# Patient Record
Sex: FEMALE | Race: BLACK OR AFRICAN AMERICAN | Employment: OTHER | URBAN - METROPOLITAN AREA
[De-identification: names, ages, dates, MRNs, and addresses within clinical notes are randomized per-mention and may not be internally consistent; named-entity substitution may affect disease eponyms.]

---

## 2017-01-23 ENCOUNTER — APPOINTMENT (OUTPATIENT)
Dept: GENERAL RADIOLOGY | Facility: HOSPITAL | Age: 77
DRG: 516 | End: 2017-01-23
Attending: EMERGENCY MEDICINE
Payer: MEDICARE

## 2017-01-23 ENCOUNTER — HOSPITAL ENCOUNTER (INPATIENT)
Facility: HOSPITAL | Age: 77
LOS: 2 days | Discharge: HOME | DRG: 516 | End: 2017-01-25
Attending: EMERGENCY MEDICINE | Admitting: HOSPITALIST
Payer: MEDICARE

## 2017-01-23 ENCOUNTER — ANESTHESIA EVENT (OUTPATIENT)
Dept: SURGERY | Facility: HOSPITAL | Age: 77
DRG: 516 | End: 2017-01-23
Payer: MEDICARE

## 2017-01-23 ENCOUNTER — APPOINTMENT (OUTPATIENT)
Dept: CT IMAGING | Facility: HOSPITAL | Age: 77
DRG: 516 | End: 2017-01-23
Attending: EMERGENCY MEDICINE
Payer: MEDICARE

## 2017-01-23 DIAGNOSIS — R51.9 NONINTRACTABLE HEADACHE, UNSPECIFIED CHRONICITY PATTERN, UNSPECIFIED HEADACHE TYPE: Primary | ICD-10-CM

## 2017-01-23 DIAGNOSIS — M31.6 TEMPORAL ARTERITIS (HCC): ICD-10-CM

## 2017-01-23 PROBLEM — Z91.81 AT RISK FOR FALLING: Status: ACTIVE | Noted: 2017-01-23

## 2017-01-23 LAB
ALBUMIN SERPL-MCNC: 3.9 G/DL (ref 3.5–4.8)
ALP LIVER SERPL-CCNC: 65 U/L (ref 55–142)
ALT SERPL-CCNC: 20 U/L (ref 14–54)
AST SERPL-CCNC: 18 U/L (ref 15–41)
ATRIAL RATE: 76 BPM
BASOPHILS # BLD AUTO: 0.02 X10(3) UL (ref 0–0.1)
BASOPHILS NFR BLD AUTO: 0.2 %
BILIRUB SERPL-MCNC: 0.2 MG/DL (ref 0.1–2)
BUN BLD-MCNC: 17 MG/DL (ref 8–20)
C-REACTIVE PROTEIN: 1.59 MG/DL (ref ?–1)
CALCIUM BLD-MCNC: 9.4 MG/DL (ref 8.3–10.3)
CHLORIDE: 100 MMOL/L (ref 101–111)
CO2: 27 MMOL/L (ref 22–32)
CREAT BLD-MCNC: 0.99 MG/DL (ref 0.55–1.02)
EOSINOPHIL # BLD AUTO: 0.08 X10(3) UL (ref 0–0.3)
EOSINOPHIL NFR BLD AUTO: 1 %
ERYTHROCYTE [DISTWIDTH] IN BLOOD BY AUTOMATED COUNT: 14.5 % (ref 11.5–16)
EST. AVERAGE GLUCOSE BLD GHB EST-MCNC: 151 MG/DL (ref 68–126)
GLUCOSE BLD-MCNC: 127 MG/DL (ref 70–99)
GLUCOSE BLD-MCNC: 128 MG/DL (ref 65–99)
GLUCOSE BLD-MCNC: 254 MG/DL (ref 65–99)
GLUCOSE BLD-MCNC: 351 MG/DL (ref 65–99)
HBA1C MFR BLD HPLC: 6.9 % (ref ?–5.7)
HCT VFR BLD AUTO: 31 % (ref 34–50)
HGB BLD-MCNC: 10.3 G/DL (ref 12–16)
HSCRP: 17.9 MG/L (ref ?–3)
IMMATURE GRANULOCYTE COUNT: 0.03 X10(3) UL (ref 0–1)
IMMATURE GRANULOCYTE RATIO %: 0.4 %
LYMPHOCYTES # BLD AUTO: 3.25 X10(3) UL (ref 0.9–4)
LYMPHOCYTES NFR BLD AUTO: 40 %
M PROTEIN MFR SERPL ELPH: 8.2 G/DL (ref 6.1–8.3)
MCH RBC QN AUTO: 26.4 PG (ref 27–33.2)
MCHC RBC AUTO-ENTMCNC: 33.2 G/DL (ref 31–37)
MCV RBC AUTO: 79.5 FL (ref 81–100)
MONOCYTES # BLD AUTO: 0.84 X10(3) UL (ref 0.1–0.6)
MONOCYTES NFR BLD AUTO: 10.3 %
NEUTROPHIL ABS PRELIM: 3.9 X10 (3) UL (ref 1.3–6.7)
NEUTROPHILS # BLD AUTO: 3.9 X10(3) UL (ref 1.3–6.7)
NEUTROPHILS NFR BLD AUTO: 48.1 %
P AXIS: 48 DEGREES
P-R INTERVAL: 164 MS
PLATELET # BLD AUTO: 268 10(3)UL (ref 150–450)
POTASSIUM SERPL-SCNC: 5 MMOL/L (ref 3.6–5.1)
Q-T INTERVAL: 370 MS
QRS DURATION: 74 MS
QTC CALCULATION (BEZET): 416 MS
R AXIS: 17 DEGREES
RBC # BLD AUTO: 3.9 X10(6)UL (ref 3.8–5.1)
RED CELL DISTRIBUTION WIDTH-SD: 41.7 FL (ref 35.1–46.3)
RHEUMATOID FACT SERPL-ACNC: <10 IU/ML (ref ?–15)
SED RATE-ML: 56 MM/HR (ref 0–25)
SODIUM SERPL-SCNC: 135 MMOL/L (ref 136–144)
T AXIS: 40 DEGREES
TROPONIN: <0.046 NG/ML (ref ?–0.05)
VENTRICULAR RATE: 76 BPM
WBC # BLD AUTO: 8.1 X10(3) UL (ref 4–13)

## 2017-01-23 PROCEDURE — 70450 CT HEAD/BRAIN W/O DYE: CPT

## 2017-01-23 PROCEDURE — 99223 1ST HOSP IP/OBS HIGH 75: CPT | Performed by: HOSPITALIST

## 2017-01-23 PROCEDURE — 71010 XR CHEST AP PORTABLE  (CPT=71010): CPT

## 2017-01-23 PROCEDURE — 99223 1ST HOSP IP/OBS HIGH 75: CPT | Performed by: OTHER

## 2017-01-23 RX ORDER — PREDNISONE 20 MG/1
60 TABLET ORAL ONCE
Status: COMPLETED | OUTPATIENT
Start: 2017-01-23 | End: 2017-01-23

## 2017-01-23 RX ORDER — TETRACAINE HYDROCHLORIDE 5 MG/ML
1 SOLUTION OPHTHALMIC ONCE
Status: COMPLETED | OUTPATIENT
Start: 2017-01-23 | End: 2017-01-23

## 2017-01-23 RX ORDER — MORPHINE SULFATE 2 MG/ML
2 INJECTION, SOLUTION INTRAMUSCULAR; INTRAVENOUS EVERY 2 HOUR PRN
Status: DISCONTINUED | OUTPATIENT
Start: 2017-01-23 | End: 2017-01-25

## 2017-01-23 RX ORDER — METOPROLOL SUCCINATE 50 MG/1
50 TABLET, EXTENDED RELEASE ORAL
Status: DISCONTINUED | OUTPATIENT
Start: 2017-01-23 | End: 2017-01-25

## 2017-01-23 RX ORDER — LATANOPROST 50 UG/ML
1 SOLUTION/ DROPS OPHTHALMIC NIGHTLY
Status: DISCONTINUED | OUTPATIENT
Start: 2017-01-23 | End: 2017-01-25

## 2017-01-23 RX ORDER — MORPHINE SULFATE 2 MG/ML
INJECTION, SOLUTION INTRAMUSCULAR; INTRAVENOUS
Status: COMPLETED
Start: 2017-01-23 | End: 2017-01-23

## 2017-01-23 RX ORDER — TETRACAINE HYDROCHLORIDE 5 MG/ML
1 SOLUTION OPHTHALMIC 2 TIMES DAILY PRN
Status: DISCONTINUED | OUTPATIENT
Start: 2017-01-23 | End: 2017-01-25

## 2017-01-23 RX ORDER — HEPARIN SODIUM 5000 [USP'U]/ML
5000 INJECTION, SOLUTION INTRAVENOUS; SUBCUTANEOUS EVERY 8 HOURS
Status: DISCONTINUED | OUTPATIENT
Start: 2017-01-23 | End: 2017-01-25

## 2017-01-23 RX ORDER — HYDROMORPHONE HYDROCHLORIDE 1 MG/ML
0.5 INJECTION, SOLUTION INTRAMUSCULAR; INTRAVENOUS; SUBCUTANEOUS EVERY 30 MIN PRN
Status: ACTIVE | OUTPATIENT
Start: 2017-01-23 | End: 2017-01-23

## 2017-01-23 RX ORDER — SODIUM CHLORIDE 9 MG/ML
INJECTION, SOLUTION INTRAVENOUS CONTINUOUS
Status: ACTIVE | OUTPATIENT
Start: 2017-01-23 | End: 2017-01-23

## 2017-01-23 RX ORDER — MORPHINE SULFATE 2 MG/ML
1 INJECTION, SOLUTION INTRAMUSCULAR; INTRAVENOUS EVERY 2 HOUR PRN
Status: DISCONTINUED | OUTPATIENT
Start: 2017-01-23 | End: 2017-01-25

## 2017-01-23 RX ORDER — SODIUM CHLORIDE 9 MG/ML
INJECTION, SOLUTION INTRAVENOUS CONTINUOUS
Status: DISCONTINUED | OUTPATIENT
Start: 2017-01-23 | End: 2017-01-25

## 2017-01-23 RX ORDER — LISINOPRIL 20 MG/1
20 TABLET ORAL DAILY
Status: DISCONTINUED | OUTPATIENT
Start: 2017-01-23 | End: 2017-01-25

## 2017-01-23 RX ORDER — DEXTROSE MONOHYDRATE 25 G/50ML
50 INJECTION, SOLUTION INTRAVENOUS
Status: DISCONTINUED | OUTPATIENT
Start: 2017-01-23 | End: 2017-01-25

## 2017-01-23 RX ORDER — ASPIRIN 81 MG/1
81 TABLET ORAL DAILY
Status: DISCONTINUED | OUTPATIENT
Start: 2017-01-23 | End: 2017-01-23

## 2017-01-23 RX ORDER — MORPHINE SULFATE 4 MG/ML
4 INJECTION, SOLUTION INTRAMUSCULAR; INTRAVENOUS EVERY 2 HOUR PRN
Status: DISCONTINUED | OUTPATIENT
Start: 2017-01-23 | End: 2017-01-25

## 2017-01-23 RX ORDER — ACETAMINOPHEN 325 MG/1
650 TABLET ORAL EVERY 6 HOURS PRN
Status: DISCONTINUED | OUTPATIENT
Start: 2017-01-23 | End: 2017-01-25

## 2017-01-23 RX ORDER — MORPHINE SULFATE 2 MG/ML
2 INJECTION, SOLUTION INTRAMUSCULAR; INTRAVENOUS ONCE
Status: COMPLETED | OUTPATIENT
Start: 2017-01-23 | End: 2017-01-23

## 2017-01-23 RX ORDER — TETRACAINE HYDROCHLORIDE 5 MG/ML
SOLUTION OPHTHALMIC
Status: COMPLETED
Start: 2017-01-23 | End: 2017-01-23

## 2017-01-23 RX ORDER — ONDANSETRON 2 MG/ML
4 INJECTION INTRAMUSCULAR; INTRAVENOUS EVERY 4 HOURS PRN
Status: DISCONTINUED | OUTPATIENT
Start: 2017-01-23 | End: 2017-01-25

## 2017-01-23 NOTE — ANESTHESIA PREPROCEDURE EVALUATION
PRE-OP EVALUATION    Patient Name: Eleazar Crowell    Pre-op Diagnosis: HEADACHES    Procedure(s):  RIGHT, POSSIBLE BILATERAL TEMPORAL ARTERY BIOPSY    Surgeon(s) and Role:     Garret Hannah MD - Primary    Pre-op vitals reviewed.   Temp: 98.1 °F ( Subcutaneous Q8H   acetaminophen (TYLENOL) tab 650 mg 650 mg Oral Q6H PRN   insulin detemir (LEVEMIR) 100 UNIT/ML flextouch 10 Units 10 Units Subcutaneous [email or web address]   morphINE sulfate (PF) 2 MG/ML injection 1 mg 1 mg Intravenous Q2H PRN   Or      morph 01/23/2017   HCT 31.0* 01/23/2017   MCV 79.5* 01/23/2017   MCH 26.4* 01/23/2017   MCHC 33.2 01/23/2017   RDW 14.5 01/23/2017   .0 01/23/2017       Lab Results  Component Value Date   * 01/23/2017   K 5.0 01/23/2017   * 01/23/2017   CO2 2

## 2017-01-23 NOTE — ED PROVIDER NOTES
Patient Seen in: [facility] Emergency Department    History   Patient presents with:  Headache (neurologic)  Nausea/Vomiting/Diarrhea (gastrointestinal)    Stated Complaint: Vesta ARAUJO    52-year-old female presents emergency with chief com mouth daily. metformin (GLUCOPHAGE) 1000 MG Oral Tab,  Take 1,000 mg by mouth 2 (two) times daily with meals. No family history on file.       Smoking Status: Never Smoker                      Smokeless Status: Never Used                        Alco tenderness  EXTREMITIES: No peripheral edema, no calf tenderness, dorsal pedal pulses present and equal bilaterally. SKIN: Warm, dry, intact, no rashes.   NEUROLOGIC EXAM: Tongue midline, no facial drooping, no ptosis, muscle strength +5/5 bilateral upper with the patient and daughter, patient does have elevated sed rate with tenderness over the temple region. Concerning for temporal arteritis, discussed with neurologist David Moran and Dr. Genesis Glover.   General surgeon Dr. Gonzalez was also contacted for possibl

## 2017-01-23 NOTE — ED NOTES
Patient states pain is improved. She states pain in legs, arms and head 5/10. However states pain in left neck and shoulder remains a 10/10.  Respirations normal skin p/w/d

## 2017-01-23 NOTE — ED NOTES
Pt resting flat on stretcher in dark room. Pt stating pain 7/10 to left side of face and left shoulder.  Pt inquiring about her BS

## 2017-01-23 NOTE — CONSULTS
[facility]  Report of Consultation    Codey Pulido Patient Status:  Inpatient    1940 MRN [id number]   [facility] 5NW-A Attending Niki Pires MD   Hosp Day # 0 [facility] 30[address] [facility]     Reason for Consultation:  Temporal artery biop drink alcohol or use illicit drugs.     Allergies:  No Known Allergies    Medications:    Current facility-administered medications:   •  ondansetron HCl (ZOFRAN) injection 4 mg, 4 mg, Intravenous, Q4H PRN  •  [START ON 1/24/2017] predniSONE (DELTASONE) tab Negative for eye discharge and vision loss  Gastrointestinal:  Negative for abdominal pain, constipation, decreased appetite, diarrhea and vomiting  Genitourinary:  Negative for dysuria and hematuria  Hema/Lymph:  Negative for easy bleeding and easy bruis NEPRELIM  3.90   WBC  8.1   PLT  268.0       Recent Labs   Lab  01/23/17   0440   GLU  127*   BUN  17   CREATSERUM  0.99   CA  9.4   ALB  3.9   NA  135*   K  5.0   CL  100*   CO2  27.0   ALKPHO  65   AST  18   ALT  20   BILT  0.2   TP  8.2         No res CRP.  She was given a dose of steroids, and feels somewhat better. A temporal artery biopsy was requested. Her daughter acted as her . General: Alert, orientated x3. Cooperative. No apparent distress.   HEENT:  +palpable pulse anterior to ear

## 2017-01-23 NOTE — CONSULTS
[id number] [place] [address] with [facility]  1/23/2017    10:34 AM      Admitted because of 3 days duration of headache mainly frontotemporal and had elevated ESR and CRP.   No other ongoing medical issues to explain si completed 10:40 AM

## 2017-01-23 NOTE — H&P
[facility]PITAL[facility]  History and Physical     Pat Flaco Patient Status:  Inpatient    1940 MRN [id number]   [facility] 5NW-A Attending Savanna Zayas MD   Hosp Day # 0 [place] [address] [facility]     Chief Complaint: Headache    History of Pr Solution 1 drop. Disp:  Rfl:    aspirin 81 MG Oral Tab Take 81 mg by mouth daily. Disp:  Rfl:    glipiZIDE (GLUCOTROL) 10 MG Oral Tab Take 10 mg by mouth daily. Disp:  Rfl:    lisinopril (PRINIVIL,ZESTRIL) 10 MG Oral Tab Take 20 mg by mouth daily.    Disp: data reviewed in Epic. ASSESSMENT / PLAN:     1. Frontotemporal HA - 2/2 likely temporal arteritis, elevated ESR and CRP, was started on presumptive prednisone 60mg daily in the ED, TA biopsy ordered  2. HTN - controlled on Lisinopril and Toprol  3.  D

## 2017-01-23 NOTE — PLAN OF CARE
Diabetes/Glucose Control    • Glucose maintained within prescribed range Progressing        Patient/Family Goals    • Patient/Family Long Term Goal Progressing    • Patient/Family Short Term Goal Progressing        DX: HEADACHE  PLAN OF CARE: IV FLUIDS, CA

## 2017-01-23 NOTE — PHYSICAL THERAPY NOTE
PT order received for an evaluation this pm, however per KAYLEE Niño, pt will be having B temporal artery biopsy tomorrow morning. PT will hold until following completion of the procedure. Pt will require activity orders when medically appropriate.  RN awar

## 2017-01-24 ENCOUNTER — SURGERY (OUTPATIENT)
Age: 77
End: 2017-01-24

## 2017-01-24 ENCOUNTER — ANESTHESIA (OUTPATIENT)
Dept: SURGERY | Facility: HOSPITAL | Age: 77
DRG: 516 | End: 2017-01-24
Payer: MEDICARE

## 2017-01-24 LAB
ANA SCREEN: NEGATIVE
GLUCOSE BLD-MCNC: 117 MG/DL (ref 65–99)
GLUCOSE BLD-MCNC: 154 MG/DL (ref 65–99)
GLUCOSE BLD-MCNC: 361 MG/DL (ref 65–99)
GLUCOSE BLD-MCNC: 442 MG/DL (ref 65–99)

## 2017-01-24 PROCEDURE — 99233 SBSQ HOSP IP/OBS HIGH 50: CPT | Performed by: OTHER

## 2017-01-24 PROCEDURE — 03BT0ZX EXCISION OF LEFT TEMPORAL ARTERY, OPEN APPROACH, DIAGNOSTIC: ICD-10-PCS | Performed by: SURGERY

## 2017-01-24 PROCEDURE — 99232 SBSQ HOSP IP/OBS MODERATE 35: CPT | Performed by: HOSPITALIST

## 2017-01-24 RX ORDER — LABETALOL HYDROCHLORIDE 5 MG/ML
5 INJECTION, SOLUTION INTRAVENOUS EVERY 5 MIN PRN
Status: DISCONTINUED | OUTPATIENT
Start: 2017-01-24 | End: 2017-01-24 | Stop reason: HOSPADM

## 2017-01-24 RX ORDER — SODIUM CHLORIDE, SODIUM LACTATE, POTASSIUM CHLORIDE, CALCIUM CHLORIDE 600; 310; 30; 20 MG/100ML; MG/100ML; MG/100ML; MG/100ML
INJECTION, SOLUTION INTRAVENOUS CONTINUOUS
Status: DISCONTINUED | OUTPATIENT
Start: 2017-01-24 | End: 2017-01-25

## 2017-01-24 RX ORDER — DEXTROSE MONOHYDRATE 25 G/50ML
50 INJECTION, SOLUTION INTRAVENOUS
Status: DISCONTINUED | OUTPATIENT
Start: 2017-01-24 | End: 2017-01-24 | Stop reason: HOSPADM

## 2017-01-24 RX ORDER — LIDOCAINE HYDROCHLORIDE 10 MG/ML
INJECTION, SOLUTION INFILTRATION; PERINEURAL AS NEEDED
Status: DISCONTINUED | OUTPATIENT
Start: 2017-01-24 | End: 2017-01-24 | Stop reason: HOSPADM

## 2017-01-24 RX ORDER — ROPINIROLE 0.25 MG/1
0.25 TABLET, FILM COATED ORAL NIGHTLY
Status: DISCONTINUED | OUTPATIENT
Start: 2017-01-24 | End: 2017-01-25

## 2017-01-24 RX ORDER — ACETAMINOPHEN AND CODEINE PHOSPHATE 300; 30 MG/1; MG/1
1 TABLET ORAL EVERY 4 HOURS PRN
Status: DISCONTINUED | OUTPATIENT
Start: 2017-01-24 | End: 2017-01-25

## 2017-01-24 RX ORDER — DEXTROSE MONOHYDRATE 25 G/50ML
50 INJECTION, SOLUTION INTRAVENOUS
Status: DISCONTINUED | OUTPATIENT
Start: 2017-01-24 | End: 2017-01-25

## 2017-01-24 RX ORDER — HYDROMORPHONE HYDROCHLORIDE 1 MG/ML
0.4 INJECTION, SOLUTION INTRAMUSCULAR; INTRAVENOUS; SUBCUTANEOUS EVERY 5 MIN PRN
Status: DISCONTINUED | OUTPATIENT
Start: 2017-01-24 | End: 2017-01-24 | Stop reason: HOSPADM

## 2017-01-24 RX ORDER — NALOXONE HYDROCHLORIDE 0.4 MG/ML
80 INJECTION, SOLUTION INTRAMUSCULAR; INTRAVENOUS; SUBCUTANEOUS AS NEEDED
Status: DISCONTINUED | OUTPATIENT
Start: 2017-01-24 | End: 2017-01-24 | Stop reason: HOSPADM

## 2017-01-24 RX ORDER — ACETAMINOPHEN AND CODEINE PHOSPHATE 300; 30 MG/1; MG/1
2 TABLET ORAL EVERY 4 HOURS PRN
Status: DISCONTINUED | OUTPATIENT
Start: 2017-01-24 | End: 2017-01-25

## 2017-01-24 RX ORDER — ONDANSETRON 2 MG/ML
4 INJECTION INTRAMUSCULAR; INTRAVENOUS AS NEEDED
Status: DISCONTINUED | OUTPATIENT
Start: 2017-01-24 | End: 2017-01-24 | Stop reason: HOSPADM

## 2017-01-24 NOTE — H&P (VIEW-ONLY)
[facility]  Report of Consultation    Josiane Gonzalez Patient Status:  Inpatient    1940 MRN [id number]   [facility] 5NW-A Attending Flaquita Victoria MD   Hosp Day # 0 [facility] [address] [facility]     Reason for Consultation:  Temporal artery biop drink alcohol or use illicit drugs.     Allergies:  No Known Allergies    Medications:    Current facility-administered medications:   •  ondansetron HCl (ZOFRAN) injection 4 mg, 4 mg, Intravenous, Q4H PRN  •  [START ON 1/24/2017] predniSONE (DELTASONE) tab Negative for eye discharge and vision loss  Gastrointestinal:  Negative for abdominal pain, constipation, decreased appetite, diarrhea and vomiting  Genitourinary:  Negative for dysuria and hematuria  Hema/Lymph:  Negative for easy bleeding and easy bruis NEPRELIM  3.90   WBC  8.1   PLT  268.0       Recent Labs   Lab  01/23/17   0440   GLU  127*   BUN  17   CREATSERUM  0.99   CA  9.4   ALB  3.9   NA  135*   K  5.0   CL  100*   CO2  27.0   ALKPHO  65   AST  18   ALT  20   BILT  0.2   TP  8.2         No res CRP.  She was given a dose of steroids, and feels somewhat better. A temporal artery biopsy was requested. Her daughter acted as her . General: Alert, orientated x3. Cooperative. No apparent distress.   HEENT:  +palpable pulse anterior to ear

## 2017-01-24 NOTE — PLAN OF CARE
Problem: Patient/Family Goals  Goal: Patient/Family Short Term Goal  Patient’s Short Term Goal: 1/23 (2100) to have biopsy done tomorrow   1/24-bx today    Interventions:   1/24-bx today  - Sx consult   - See additional Care Plan goals for specific interve

## 2017-01-24 NOTE — PROGRESS NOTES
Pt is a 70 y/o female admitted with left sided pain/weakness/alert oriented. denies any pain. Left  temporal artery biopsy today. NPO since midnight. no fever,N/V SOB noted. family at bedside. no acute issues noted. At 1735 pts blood sugar was 442. asymptomatic,

## 2017-01-24 NOTE — PAYOR COMM NOTE
Attending Physician: Brenda Palm MD    Review Type: ADMISSION   Reviewer: Freida Santos       Date: January 24, 2017 - 10:37 AM  Payor: [address] Number: N/A  Admit date: 1/23/2017  4:31 AM   Admitted from Emergency Dept.:  Yes LAST 1 DAY:  aspirin EC tab 81 mg     Date Action Dose Route User    1/23/2017 1215 Given 81 mg Oral Sunshine Friedman RN      Heparin Sodium (Porcine) 5000 UNIT/ML injection 5,000 Units     Date Action Dose Route User    1/23/2017 1530 Given 5000 Units (AUTOMATED) - Abnormal; Notable for the following:     Sed Rate 56 (*)     All other components within normal limits   C-RP/HIGH SENSITIVITY - Abnormal; Notable for the following:     hsCRP 17.90 (*)     All other components within normal limits   C-REACTI

## 2017-01-24 NOTE — PROGRESS NOTES
01/23/17 2018   Provider Notification   Reason for Communication Review case   Provider Name Rickey   Method of Communication Page   Response Waiting for response   Notification Time 2018     MD notified of elevated BS of 351.

## 2017-01-24 NOTE — PROGRESS NOTES
[address] [address] Neurology Progress Note    Neoma Ax Patient Status:  Inpatient    1940 MRN [id number]   [facility] 5NW-A Attending Phuong Seaman MD   Hosp Day # 1 PCP ALYSE Salazar         Subjective:  Neoma Ax is a(n) H/H 10.3/31  1/23 Rheumatoid arthritis negative   ANCA panel, KRISHNA pending   HgbA1c: 6.9        Imagin/23 Head CT:  CONCLUSION:  1. No acute intracranial hemorrhage.   2. Findings most consistent with chronic small vessel ischemic change within [place]  1/24/2017  7:29 AM  Spectra [id number]      NEUROLOGY   Attending Addendum Valentino Pereyra Notes:    Above notes reviewed and patient seen and examined concurrently with APN  (xxx) based on my examination,  the above author's visit and findings are all valid

## 2017-01-24 NOTE — INTERVAL H&P NOTE
Pre-op Diagnosis: HEADACHES    The above referenced H&P was reviewed by John Roque MD on 1/24/2017, the patient was examined and no significant changes have occurred in the patient's condition since the H&P was performed.   I discussed with the pat

## 2017-01-24 NOTE — OPERATIVE REPORT
PREOPERATIVE DIAGNOSIS: Intractable headaches with elevated ESR and CRP  POSTOPERATIVE DIAGNOSIS: Intractable headaches with elevated ESR and CRP   PROCEDURE PERFORMED: Left temporal artery biopsy  SURGEON:  Radha Jimenez MD  ASSISTANT: Eileen Brittle the artery was transected. The attachments were taken down using cautery. The artery was sent off the field as a fresh specimen. The wound was irrigated with normal saline.   The incision was then closed in 2 layers, using a 3-0 Vicryl on the deep layer

## 2017-01-24 NOTE — ANESTHESIA POSTPROCEDURE EVALUATION
[address] Patient Status:  Inpatient   Age/Gender 69year old female MRN [id number]   [facility] SURGERY Attending Nisreen Lance MD   Hosp Day # 1 PCP 30[address] [facility]       Anesthesia Post-op Note    Procedure(s):  LEFT  TE

## 2017-01-24 NOTE — PROGRESS NOTES
[facility]ITAL[facility]  Progress Note     Virginia Barrera Patient Status:  Inpatient    1940 MRN [id number]   [facility] 5NW-A Attending Gonzales Biggs MD   Hosp Day # 1 PCP 30[address] [facility]     Chief Complaint: frontotemporal HA    S: Patient wa (Porcine)  5,000 Units Subcutaneous Q8H   • insulin detemir  10 Units Subcutaneous [email or web address]       ASSESSMENT / PLAN:     1.  Frontotemporal HA - 2/2 likely temporal arteritis, elevated ESR and CRP, RA negative, ANCA and KRISHNA panel pending, cont presumpt

## 2017-01-24 NOTE — CM/SW NOTE
SW met with patient to assess, daughter at bedside. Patient is [place] speaking, therefore daughter provided writer history for this assessment. Daughter identified that patient lives with her children (5 daughters).   Daughter identified patient is very in

## 2017-01-24 NOTE — PHYSICAL THERAPY NOTE
Order received via fall risk protocol, chart reviewed. Pt currently out of room for procedure so unable to make face to face contact at this time. Per chart, pt has been up independently.   Confirmed with [facility] staff, pt is independent, at [facility]

## 2017-01-25 VITALS
HEART RATE: 71 BPM | WEIGHT: 140 LBS | DIASTOLIC BLOOD PRESSURE: 75 MMHG | HEIGHT: 64 IN | RESPIRATION RATE: 20 BRPM | SYSTOLIC BLOOD PRESSURE: 128 MMHG | BODY MASS INDEX: 23.9 KG/M2 | OXYGEN SATURATION: 96 % | TEMPERATURE: 98 F

## 2017-01-25 PROBLEM — G25.81 RESTLESS LEGS SYNDROME: Status: ACTIVE | Noted: 2017-01-25

## 2017-01-25 LAB
GLUCOSE BLD-MCNC: 118 MG/DL (ref 65–99)
GLUCOSE BLD-MCNC: 345 MG/DL (ref 65–99)
GLUCOSE BLD-MCNC: 447 MG/DL (ref 65–99)
GLUCOSE BLD-MCNC: 67 MG/DL (ref 65–99)
GLUCOSE BLD-MCNC: 68 MG/DL (ref 65–99)
MYELOPEROX ANTIBODIES, IGG: 4 AU/ML
SERINE PROTEASE3, IGG: 0 AU/ML

## 2017-01-25 PROCEDURE — 99239 HOSP IP/OBS DSCHRG MGMT >30: CPT | Performed by: HOSPITALIST

## 2017-01-25 PROCEDURE — 99233 SBSQ HOSP IP/OBS HIGH 50: CPT | Performed by: OTHER

## 2017-01-25 RX ORDER — PREDNISONE 20 MG/1
TABLET ORAL
Qty: 90 TABLET | Refills: 2 | Status: ON HOLD | OUTPATIENT
Start: 2017-01-25 | End: 2017-08-19

## 2017-01-25 RX ORDER — ROPINIROLE 0.25 MG/1
0.25 TABLET, FILM COATED ORAL NIGHTLY
Qty: 30 TABLET | Refills: 2 | Status: SHIPPED | OUTPATIENT
Start: 2017-01-25 | End: 2018-11-26

## 2017-01-25 NOTE — PROGRESS NOTES
Pt is a 68 y/o female admitted with left sided pain/weakness/alert oriented. denies any pain. s/p Left  temporal artery biopsy yesterday. no fever,N/V SOB noted. family at bedside. today morning her blood sugar was 68. One juices given,rechecked and it was 67. a

## 2017-01-25 NOTE — PROGRESS NOTES
Patient alert and orientated, primarily [place] speaking. Daughter at bedside. Oxygen WNL on room air. Pt with complaints of pain, medications given per MAR. Tolerating diet with accuchecks. Electrolyte protocol, will monitor for replacement.  Pt up ad jeremiah,

## 2017-01-25 NOTE — PROGRESS NOTES
Patient feeling better wrt to previous headache. Just has some mild biopsy site pain. Vitals stable. Exam bening; biopsy site c/d/i; ok to d/c from my perspective. D/c on oral prednisone with close pcp,neuro,surgical f/u.     MD Michael Zamarripa

## 2017-01-25 NOTE — PROGRESS NOTES
01/24/17 2105   Provider Notification   Reason for Communication Review case   Provider Name Rickey   Method of Communication Page   Response Waiting for response   Notification Time 2105       MD paged for Blood Sugar of 361. Awaiting call back.  TM

## 2017-01-25 NOTE — PROGRESS NOTES
[id number] [place] [address] with [facility]  1/25/2017    8:49 AM      Followed for suspected TA  Also started on Requip for nocturnal leg pain and cramping      Slept well last night  Biopsy done yesterday   No result

## 2017-01-25 NOTE — PROGRESS NOTES
[facility]  Progress Note    Larance Fuelling Patient Status:  Inpatient    1940 MRN [id number]   [facility] 5NW-A Attending Glenda Blanco MD   Hosp Day # 2 PCP ALYSE Damico     Subjective:  Patient feeling well, mild pain at inci

## 2017-01-25 NOTE — PROGRESS NOTES
NURSING DISCHARGE NOTE    Discharged Home via Wheelchair. Accompanied by Family member and Support staff  Belongings Taken by patient/family. Pt discharged to home. discharge instruction given to pt, and her daughter. both verbalized understanding,hepl

## 2017-01-26 LAB — GLUCOSE BLD-MCNC: 229 MG/DL (ref 65–99)

## 2017-01-26 NOTE — DISCHARGE SUMMARY
[facility]PITAL[facility]  DISCHARGE SUMMARY     Lalo Concepcion Patient Status:  Inpatient    1940 MRN [id number]   [facility] 5NW-A Attending No att. providers found   Hosp Day # 2 PCP Danitza Castillo     Date of Admission: 2017  Date of D clinical presentation was compatible with possible GCA. Temporal artery biopsy obtained and prednisone started. biospy pending at d/c but headaches improved on prednisone. Will f/u with pcp and neuro and surgery after d/c.  Was discharged on oral predniso 5:21 AM   Commonly known as: Toprol XL        Take 50 mg by mouth daily. Indications: High Blood Pressure, 1 TAB DAILY    Refills:  0       TRAVATAN 0.004 % Soln   Generic drug:  Travoprost        1 drop.     Refills:  0            Where to [address] [facility]

## 2017-01-26 NOTE — CM/SW NOTE
01/26/17 0800   Discharge disposition   Discharged to: Home or Self   Patient Refuses Rehab Services Yes   Referrals provided No   Discharge transportation Private car

## 2017-02-01 ENCOUNTER — OFFICE VISIT (OUTPATIENT)
Dept: SURGERY | Facility: CLINIC | Age: 77
End: 2017-02-01

## 2017-02-01 VITALS
SYSTOLIC BLOOD PRESSURE: 122 MMHG | BODY MASS INDEX: 24 KG/M2 | TEMPERATURE: 98 F | HEART RATE: 82 BPM | WEIGHT: 140 LBS | RESPIRATION RATE: 16 BRPM | DIASTOLIC BLOOD PRESSURE: 71 MMHG

## 2017-02-01 DIAGNOSIS — R51.9 NONINTRACTABLE HEADACHE, UNSPECIFIED CHRONICITY PATTERN, UNSPECIFIED HEADACHE TYPE: Primary | ICD-10-CM

## 2017-02-01 PROCEDURE — 99024 POSTOP FOLLOW-UP VISIT: CPT | Performed by: SURGERY

## 2017-02-01 NOTE — PROGRESS NOTES
Post Operative Visit Note       Active Problems  1.  Nonintractable headache, unspecified chronicity pattern, unspecified headache type         Chief Complaint   Post-Op     History of Present Illness   The patient is a 71-year-old female who follows up aft Rfl:    pioglitazone (ACTOS) 45 MG Oral Tab Take 45 mg by mouth daily. Disp:  Rfl:    aspirin 81 MG Oral Tab Take 81 mg by mouth daily. Disp:  Rfl:    glipiZIDE (GLUCOTROL) 10 MG Oral Tab Take 10 mg by mouth daily.  Disp:  Rfl:    lisinopril (PRINIVIL,ZEST cranial nerve deficit. Skin: Skin is warm and dry. Psychiatric: She has a normal mood and affect.  Her behavior is normal. Judgment and thought content normal. Cognition and memory are normal.        PATHOLOGY   I reviewed the pathology report and provi

## 2017-02-02 ENCOUNTER — OFFICE VISIT (OUTPATIENT)
Dept: NEUROLOGY | Facility: CLINIC | Age: 77
End: 2017-02-02

## 2017-02-02 VITALS
RESPIRATION RATE: 16 BRPM | HEIGHT: 62.5 IN | WEIGHT: 145 LBS | HEART RATE: 91 BPM | DIASTOLIC BLOOD PRESSURE: 76 MMHG | SYSTOLIC BLOOD PRESSURE: 138 MMHG | BODY MASS INDEX: 26.02 KG/M2

## 2017-02-02 DIAGNOSIS — R42 DIZZINESS: ICD-10-CM

## 2017-02-02 DIAGNOSIS — R51.9 CHRONIC INTRACTABLE HEADACHE, UNSPECIFIED HEADACHE TYPE: Primary | ICD-10-CM

## 2017-02-02 DIAGNOSIS — G89.29 CHRONIC INTRACTABLE HEADACHE, UNSPECIFIED HEADACHE TYPE: Primary | ICD-10-CM

## 2017-02-02 DIAGNOSIS — R41.3 MEMORY LOSS: ICD-10-CM

## 2017-02-02 PROCEDURE — 99214 OFFICE O/P EST MOD 30 MIN: CPT | Performed by: PHYSICIAN ASSISTANT

## 2017-02-02 RX ORDER — TOPIRAMATE 50 MG/1
TABLET, FILM COATED ORAL
Qty: 30 TABLET | Refills: 2 | Status: SHIPPED | OUTPATIENT
Start: 2017-02-02 | End: 2017-05-03

## 2017-02-02 RX ORDER — ASPIRIN 81 MG
1 TABLET,CHEWABLE ORAL DAILY
Refills: 9 | COMMUNITY
Start: 2017-01-28 | End: 2019-03-12 | Stop reason: ALTCHOICE

## 2017-02-02 NOTE — PATIENT INSTRUCTIONS
Refill policies:    • Allow 2 business days for refills; controlled substances may take longer.   • Contact your pharmacy at least 5 days prior to running out of medication and have them send an electronic request or submit request through the “request re your physician has recommended that you have a procedure or additional testing performed. [facility]) will contact your insurance carrier to obtain pre-certification or prior authorization.     Unfortunately, AMBER has seen an increas

## 2017-02-02 NOTE — PROGRESS NOTES
HPI:    Patient ID: Bhavik Muhammad is a 69year old female. HPI     Patient is a 69year old [place] speaking female here with her daughter for follow-up of headaches. Daughter interpreted for the visit as there was no  requested.   They are h breath. Cardiovascular: Negative for chest pain. Musculoskeletal: Negative for gait problem. Neurological: Positive for dizziness, numbness and headaches. Negative for syncope and facial asymmetry.               Current Outpatient Prescriptions:  ASP strength. She displays normal reflexes. No cranial nerve deficit. She displays a negative Romberg sign. Coordination and gait normal.   Reflex Scores:       Tricep reflexes are 2+ on the right side and 2+ on the left side.        Bicep reflexes are 2+ on th

## 2017-02-03 PROBLEM — R51.9 NONINTRACTABLE HEADACHE, UNSPECIFIED CHRONICITY PATTERN, UNSPECIFIED HEADACHE TYPE: Status: RESOLVED | Noted: 2017-01-23 | Resolved: 2017-02-03

## 2017-02-03 PROBLEM — M31.6 TEMPORAL ARTERITIS (HCC): Status: RESOLVED | Noted: 2017-01-23 | Resolved: 2017-02-03

## 2017-02-03 PROBLEM — R41.3 MEMORY LOSS: Status: ACTIVE | Noted: 2017-02-03

## 2017-02-07 ENCOUNTER — TELEPHONE (OUTPATIENT)
Dept: NEUROLOGY | Facility: CLINIC | Age: 77
End: 2017-02-07

## 2017-02-07 NOTE — TELEPHONE ENCOUNTER
Authorization #[id number] from 2-7-17 through 5-7-17 at [address], [address] [place] [address], [address] [place] [address]    Call reference #[id number]. Time on call 16:18. Per Kaushik Nunn at [facility] .     Called patients daughter Mo Dewey (HIPPA [place]) gave her above

## 2017-02-13 ENCOUNTER — HOSPITAL ENCOUNTER (OUTPATIENT)
Dept: MRI IMAGING | Age: 77
Discharge: HOME | End: 2017-02-13
Attending: PHYSICIAN ASSISTANT
Payer: MEDICARE

## 2017-02-13 ENCOUNTER — TELEPHONE (OUTPATIENT)
Dept: NEUROLOGY | Facility: CLINIC | Age: 77
End: 2017-02-13

## 2017-02-13 DIAGNOSIS — G89.29 CHRONIC INTRACTABLE HEADACHE, UNSPECIFIED HEADACHE TYPE: ICD-10-CM

## 2017-02-13 DIAGNOSIS — R51.9 CHRONIC INTRACTABLE HEADACHE, UNSPECIFIED HEADACHE TYPE: ICD-10-CM

## 2017-02-13 DIAGNOSIS — R42 DIZZINESS: ICD-10-CM

## 2017-02-13 PROCEDURE — 70553 MRI BRAIN STEM W/O & W/DYE: CPT

## 2017-02-13 PROCEDURE — 70549 MR ANGIOGRAPH NECK W/O&W/DYE: CPT

## 2017-02-13 PROCEDURE — A9575 INJ GADOTERATE MEGLUMI 0.1ML: HCPCS | Performed by: PHYSICIAN ASSISTANT

## 2017-02-13 PROCEDURE — 70544 MR ANGIOGRAPHY HEAD W/O DYE: CPT

## 2017-02-13 NOTE — TELEPHONE ENCOUNTER
Please call to inform patient that MRA brain was unremarkable and MRA neck showed possible 30 % stenosis of the left ica but maybe artifactual. THe MRI Brain just showed moderate chronic ischemic changes nothing acute.

## 2017-02-14 NOTE — TELEPHONE ENCOUNTER
Daughter notified of results as below. Stated mother is taking the Topamax as prescribed but continues with some dizziness. Verified follow up appointment on 2-17-17.

## 2017-02-17 ENCOUNTER — OFFICE VISIT (OUTPATIENT)
Dept: NEUROLOGY | Facility: CLINIC | Age: 77
End: 2017-02-17

## 2017-02-17 VITALS
DIASTOLIC BLOOD PRESSURE: 72 MMHG | HEIGHT: 62.5 IN | HEART RATE: 88 BPM | RESPIRATION RATE: 16 BRPM | BODY MASS INDEX: 26.02 KG/M2 | WEIGHT: 145 LBS | SYSTOLIC BLOOD PRESSURE: 140 MMHG

## 2017-02-17 DIAGNOSIS — M47.22 CERVICAL SPONDYLOSIS WITH RADICULOPATHY: Primary | ICD-10-CM

## 2017-02-17 DIAGNOSIS — M79.18 CERVICAL MYOFASCIAL PAIN SYNDROME: ICD-10-CM

## 2017-02-17 PROCEDURE — 99213 OFFICE O/P EST LOW 20 MIN: CPT | Performed by: OTHER

## 2017-02-17 RX ORDER — TOBRAMYCIN AND DEXAMETHASONE 3; 1 MG/ML; MG/ML
SUSPENSION/ DROPS OPHTHALMIC
Status: ON HOLD | COMMUNITY
End: 2019-08-23

## 2017-02-17 RX ORDER — ACETAMINOPHEN / DIPHENHYDRAMINE 25; 500 MG/1; MG/1
TABLET ORAL
COMMUNITY
End: 2018-11-26

## 2017-02-17 RX ORDER — GLIPIZIDE 10 MG/1
1 TABLET, FILM COATED, EXTENDED RELEASE ORAL 2 TIMES DAILY
Refills: 6 | COMMUNITY
Start: 2017-02-07 | End: 2018-04-19 | Stop reason: ALTCHOICE

## 2017-02-17 RX ORDER — TRAMADOL HYDROCHLORIDE 50 MG/1
1 TABLET ORAL EVERY 6 HOURS
Refills: 3 | COMMUNITY
Start: 2017-02-07 | End: 2018-12-03

## 2017-02-17 RX ORDER — FAMOTIDINE 20 MG/1
20 TABLET ORAL 2 TIMES DAILY
COMMUNITY

## 2017-02-17 RX ORDER — LISINOPRIL 30 MG/1
20 TABLET ORAL DAILY
Status: ON HOLD | COMMUNITY
End: 2017-08-19

## 2017-02-17 RX ORDER — ERGOCALCIFEROL 1.25 MG/1
1 CAPSULE ORAL WEEKLY
Refills: 11 | COMMUNITY
Start: 2017-02-07 | End: 2018-11-26 | Stop reason: ALTCHOICE

## 2017-02-17 NOTE — PROGRESS NOTES
[facility] with [address]  1/18/1940  Primary Care Provider:  Alfredo Duckworth    2/17/2017    69year old yo patient being seen for:  Headache and elevated ESR    He pathology did n Oral Tablet 24 Hr, Take 50 mg by mouth daily. Indications: High Blood Pressure, 1 TAB DAILY, Disp: , Rfl:   •  Travoprost (TRAVATAN) 0.004 % Ophthalmic Solution, 1 drop., Disp: , Rfl:   •  pioglitazone (ACTOS) 45 MG Oral Tab, Take 45 mg by mouth daily. Sang Zavala

## 2017-02-17 NOTE — PATIENT INSTRUCTIONS
Refill policies:    • Allow 2 business days for refills; controlled substances may take longer.   • Contact your pharmacy at least 5 days prior to running out of medication and have them send an electronic request or submit request through the “request re your physician has recommended that you have a procedure or additional testing performed. [facility]) will contact your insurance carrier to obtain pre-certification or prior authorization.     Unfortunately, AMBER has seen an increas

## 2017-02-20 ENCOUNTER — TELEPHONE (OUTPATIENT)
Dept: NEUROLOGY | Facility: CLINIC | Age: 77
End: 2017-02-20

## 2017-02-20 NOTE — TELEPHONE ENCOUNTER
Called [facility] and spoke with Selma Chan gave her all information for MRI cervical .    Authorization #[id number] at [address] [place] [address] from 2/20/17 - 5/20/17. Time on call 13:43 reference #[id number].     Called patient daughter Primus Ou (HIPPA [place]) left detail

## 2017-02-23 ENCOUNTER — OFFICE VISIT (OUTPATIENT)
Dept: PHYSICAL THERAPY | Age: 77
End: 2017-02-23
Attending: Other
Payer: MEDICARE

## 2017-02-23 DIAGNOSIS — M47.22 CERVICAL SPONDYLOSIS WITH RADICULOPATHY: Primary | ICD-10-CM

## 2017-02-23 PROCEDURE — 97140 MANUAL THERAPY 1/> REGIONS: CPT

## 2017-02-23 PROCEDURE — 97163 PT EVAL HIGH COMPLEX 45 MIN: CPT

## 2017-02-23 NOTE — PROGRESS NOTES
SPINE EVALUATION:   Referring Physician: Dr. Mia Anne  Diagnosis: Tension HA L cervical/scap mm spasms     Date of Service: 2/23/2017     PATIENT SUMMARY   Mikey Sheikh is a 69year old y/o female who presents to therapy today with complaints of L s diabetes, depression      ASSESSMENT  Hardeep Ireland presents with c/o entire L side N/T, weakness, and pain, but then demonstrates WNL dermotome and myotome testing. Possible that due to language barrier, pt's symptoms may not be accurately described.  Pt does demo Upper Trap: R WNL; L MOD*  Levator Scap: R WNL; L MOD*     Special tests:no change with cervical distraction; INC pain with cervical compression  MOD pain L with suboccipital pressure    Today’s Treatment and Response: L UT, LS, cervical paraspinal, and contact me at Dept: [phone]    Sincerely,  Electronically signed by therapist: Gemma Shearer, PT    [de-identified] certification required: Yes  I certify the need for these services furnished under this plan of treatment and while under my care.     X

## 2017-02-27 ENCOUNTER — HOSPITAL ENCOUNTER (OUTPATIENT)
Dept: MRI IMAGING | Facility: HOSPITAL | Age: 77
Discharge: HOME | End: 2017-02-27
Attending: Other
Payer: MEDICARE

## 2017-02-27 DIAGNOSIS — M79.18 CERVICAL MYOFASCIAL PAIN SYNDROME: ICD-10-CM

## 2017-02-27 DIAGNOSIS — M47.22 CERVICAL SPONDYLOSIS WITH RADICULOPATHY: ICD-10-CM

## 2017-02-27 PROCEDURE — 72141 MRI NECK SPINE W/O DYE: CPT

## 2017-02-28 ENCOUNTER — OFFICE VISIT (OUTPATIENT)
Dept: PHYSICAL THERAPY | Age: 77
End: 2017-02-28
Attending: Other
Payer: MEDICARE

## 2017-02-28 PROCEDURE — 97110 THERAPEUTIC EXERCISES: CPT

## 2017-02-28 PROCEDURE — 97140 MANUAL THERAPY 1/> REGIONS: CPT

## 2017-02-28 NOTE — PROGRESS NOTES
Dx: Tension HA L cervical/scap mm spasms             Authorized # of Visits:  Drew Mcneal         Next MD visit: none scheduled  Fall Risk: standard         Precautions: n/a             Subjective: Still having the same symptoms, but less pain.  Did not have any p STM   -scar L ant ear x3 min  -cervical paraspinals, UT, LS x6 min  -L scalenes and SCM x4 min         CT prone mob Gr II 3x30s         PA T1-T4 Gr II-III 3x20s ea         L scapular mobs with UT and LS fascial release x5 min         Deep neck flexor re-

## 2017-03-01 ENCOUNTER — OFFICE VISIT (OUTPATIENT)
Dept: PHYSICAL THERAPY | Age: 77
End: 2017-03-01
Attending: Other
Payer: MEDICARE

## 2017-03-01 PROCEDURE — 97110 THERAPEUTIC EXERCISES: CPT

## 2017-03-01 PROCEDURE — 97140 MANUAL THERAPY 1/> REGIONS: CPT

## 2017-03-01 NOTE — PROGRESS NOTES
Dx: Tension HA L cervical/scap mm spasms             Authorized # of Visits:  Carina Drummond         Next MD visit: none scheduled  Fall Risk: standard         Precautions: n/a             Subjective: Pt reports she felt much better after therapy yesterday.  Did not glides for L foramen gapping C2-C6 x2 min TRACEE lateral glides 2x20s ea C2-C6        Trigger point release suboccipitals x2 min (varied mm)  -cervical paraspinal soft tissue release x3  x2min          -x2        STM   -scar L ant ear x3 min  -cervical parasp

## 2017-03-15 ENCOUNTER — OFFICE VISIT (OUTPATIENT)
Dept: PHYSICAL THERAPY | Age: 77
End: 2017-03-15
Attending: Other
Payer: MEDICARE

## 2017-03-15 PROCEDURE — 97140 MANUAL THERAPY 1/> REGIONS: CPT

## 2017-03-15 NOTE — PROGRESS NOTES
Dx: Tension HA L cervical/scap mm spasms             Authorized # of Visits:  Lázaro Fuchs         Next MD visit: none scheduled  Fall Risk: standard         Precautions: n/a             Subjective: Pt reports she no longer experiences headaches and the pain in he visits) -progress    Plan: initiate scapular retraining    Date: 2/28/2017  Tx#: 2/8 Date: 3/1/2017  Tx#: 3/8 Date: 3/15/2017  Tx#: 4/8 Date: Tx#: 5/ Date: Tx#: 6/ Date: Tx#: 7/ Date:    Tx#: 8/   - UBE L1 retro x3 min x3 min        Cervical distract

## 2017-03-16 ENCOUNTER — APPOINTMENT (OUTPATIENT)
Dept: PHYSICAL THERAPY | Age: 77
End: 2017-03-16
Attending: Other
Payer: MEDICARE

## 2017-03-22 ENCOUNTER — OFFICE VISIT (OUTPATIENT)
Dept: PHYSICAL THERAPY | Age: 77
End: 2017-03-22
Attending: Other
Payer: MEDICARE

## 2017-03-22 PROCEDURE — 97140 MANUAL THERAPY 1/> REGIONS: CPT

## 2017-03-22 PROCEDURE — 97112 NEUROMUSCULAR REEDUCATION: CPT

## 2017-03-22 NOTE — PROGRESS NOTES
Dx: Tension HA L cervical/scap mm spasms             Authorized # of Visits:  Veronica How         Next MD visit: none scheduled  Fall Risk: standard         Precautions: n/a             Subjective: Painful now in her L UT/levator area.  No headache and no pain in to perform scap depressions with proper form - frequent tactile, verbal, and visual cuing with daughter translating       Goals:     · Pt will improve cervical AROM rotation to >50 degrees to improve tolerance for ADL such as turning head to look into cabi shld rolls x10 (sore L, not R) Median N manual glides x10 Manual glides x1 min corner pec stretch 3x20s (inc head pain)      Kinesiotape L SCM inhibition L UT inhibition X L UT -      Biofreeze L UT/LS L SCM, cervical paraspinals - -      Education on se

## 2017-03-23 ENCOUNTER — OFFICE VISIT (OUTPATIENT)
Dept: PHYSICAL THERAPY | Age: 77
End: 2017-03-23
Attending: Other
Payer: MEDICARE

## 2017-03-23 PROCEDURE — 97112 NEUROMUSCULAR REEDUCATION: CPT

## 2017-03-23 PROCEDURE — 97140 MANUAL THERAPY 1/> REGIONS: CPT

## 2017-03-23 NOTE — PROGRESS NOTES
Dx: Tension HA L cervical/scap mm spasms             Authorized # of Visits:  Dallas Diaz         Next MD visit: none scheduled  Fall Risk: standard         Precautions: n/a             Subjective: Was not feeling good yesterday; especially because it was so cold 2/28/2017  Tx#: 2/8 Date: 3/1/2017  Tx#: 3/8 Date: 3/15/2017  Tx#: 4/8 Date: 3/22/2017  Tx#: 5/8 Date: 3/23/2017  Tx#: 6/8 Date: Tx#: 7/ Date:    Tx#: 8/   - UBE L1 retro x3 min x3 min  x2 min sci-fit UE/LE x2 min     Cervical distract Gr III 3x30s 3x30s rib 3x30s R/L     Skilled Services: Progressed scalene STM, manual stretching and initiated self stretching to decrease thoracic outlet symptoms.  Issued updated HEP* for pec stretching/posture re-ed and carry over of scalene stretching as pt had good respo

## 2017-03-29 ENCOUNTER — OFFICE VISIT (OUTPATIENT)
Dept: PHYSICAL THERAPY | Age: 77
End: 2017-03-29
Attending: Other
Payer: MEDICARE

## 2017-03-29 PROCEDURE — 97140 MANUAL THERAPY 1/> REGIONS: CPT

## 2017-03-29 PROCEDURE — 97112 NEUROMUSCULAR REEDUCATION: CPT

## 2017-03-29 NOTE — PROGRESS NOTES
Dx: Tension HA L cervical/scap mm spasms             Authorized # of Visits:  Veronica How         Next MD visit: none scheduled  Fall Risk: standard         Precautions: n/a             Subjective: Only hurting in L side of neck; no where else.  Arms have been fin glides for L foramen gapping C2-C6 x2 min TRACEE lateral glides 2x20s ea C2-C6 2x30s ea R/L Prone PA C3-C7 Gr III 2x20s ea - C4-C7 L PA prone 3x20s Gr III    Trigger point release suboccipitals x2 min (varied mm)  -cervical paraspinal soft tissue release x3 Progressed IASTM for continued mm tension and pain reproduction. Progressed cervical stretching with longer hold times and increased scap depression force. Progressed pec stretching supine on foam beam; with overhead reaching in corrected scapular plane.  R

## 2017-04-03 ENCOUNTER — APPOINTMENT (OUTPATIENT)
Dept: PHYSICAL THERAPY | Age: 77
End: 2017-04-03
Attending: Other
Payer: MEDICARE

## 2017-04-05 ENCOUNTER — OFFICE VISIT (OUTPATIENT)
Dept: PHYSICAL THERAPY | Age: 77
End: 2017-04-05
Attending: Other
Payer: MEDICARE

## 2017-04-05 PROCEDURE — 97140 MANUAL THERAPY 1/> REGIONS: CPT

## 2017-04-05 PROCEDURE — 97112 NEUROMUSCULAR REEDUCATION: CPT

## 2017-04-05 NOTE — PROGRESS NOTES
Progress Summary   Dx: Tension HA L cervical/scap mm spasms             Authorized # of Visits:  MMAI       Pt has attended 8 visits in Physical Therapy. Subjective/Assessment: Vida Harper has been making good progress in PT.  She showed positive signs of th conversation (8 visits) -MET  · Pt will report decreased frequency of headaches to <3x/week (8 visits) - MET  · Pt will improve UT and LS mm tension and tenderness to WNL to report improved ease with reaching overhead (12 visits) -part MET UT, still tight -x4 min    -x8 min      -x6 min with TPR   -x5 min    -x3 min      -x3 min   x3 min with TMJ x2 min  -x8 min      -x6 min IASTM with hawk  x10 min L trap, rhomboid, LS, paraspinals, scalenes STM   -scar L ant ear x3 min  -cervical paraspinals, UT, L pack at home to maintain/prolong benefits of manual therapy and decrease build up of mm tension between sessions. They report understanding    Charges:  Man x2, Neuro re-ed x1       Total Timed Treatment: 45 min  Total Treatment Time: 45 min

## 2017-04-12 ENCOUNTER — OFFICE VISIT (OUTPATIENT)
Dept: PHYSICAL THERAPY | Age: 77
End: 2017-04-12
Attending: Other
Payer: MEDICARE

## 2017-04-12 PROCEDURE — 97110 THERAPEUTIC EXERCISES: CPT

## 2017-04-12 PROCEDURE — 97140 MANUAL THERAPY 1/> REGIONS: CPT

## 2017-04-12 NOTE — PROGRESS NOTES
Dx: Tension HA L cervical/scap mm spasms             Authorized # of Visits:  DAVID           Subjective Doing ok today. Sometimes she feels it, and sometimes she doesn't.  Did not try a hot pack at home yet; thinks she has one at home, but not sure where s Cervical distract Gr III 3x30s 3x30s 3x20s 3x30s - - 3x30s 2x20s   R Lateral glides for L foramen gapping C2-C6 x2 min TRACEE lateral glides 2x20s ea C2-C6 2x30s ea R/L Prone PA C3-C7 Gr III 2x20s ea - C4-C7 L PA prone 3x20s Gr III  Lateral glides for alexis with manual OP pec stretch 3x30s  -with UE ABD x10 -over foam beam 2x30s with OP  -with alt overhead reaching x15 (10s hold) 2x30s with OP in I and T position Supine 1# horizontal ABD x10   Education on self scar mobs scap retract* x10 L 1st rib mobs  -sup

## 2017-04-13 ENCOUNTER — OFFICE VISIT (OUTPATIENT)
Dept: PHYSICAL THERAPY | Age: 77
End: 2017-04-13
Attending: Other
Payer: MEDICARE

## 2017-04-13 PROCEDURE — 97140 MANUAL THERAPY 1/> REGIONS: CPT

## 2017-04-13 PROCEDURE — 97110 THERAPEUTIC EXERCISES: CPT

## 2017-04-13 NOTE — PROGRESS NOTES
Dx: Tension HA L cervical/scap mm spasms             Authorized # of Visits:  Ce Levi a lot of pain today on L side neck and arm. Head is not hurting; when she has high BP her head hurts.  Has hurt since some of the exercises in the 10/12   - UBE L1 retro x3 min x3 min  x2 min sci-fit UE/LE x2 min UE only x3 min - UE only L1 x3 min FWD x3 min   Cervical distract Gr III 3x30s 3x30s 3x20s 3x30s - - 3x30s 2x20s 3x20s   R Lateral glides for L foramen gapping C2-C6 x2 min TRACEE lateral glide low trap against wall x10 x10 1# stack in eye level shelf x5 R/L   Kinesiotape L SCM inhibition L UT inhibition X L UT - Self scalene stretch 3x15s ea* (feels good L) 2x15s ea R/L Small ball hand off behind back x10 CW/CCW for neural mobs and scap retract

## 2017-04-17 ENCOUNTER — OFFICE VISIT (OUTPATIENT)
Dept: PHYSICAL THERAPY | Age: 77
End: 2017-04-17
Attending: Other
Payer: MEDICARE

## 2017-04-17 PROCEDURE — 97140 MANUAL THERAPY 1/> REGIONS: CPT

## 2017-04-17 PROCEDURE — 97110 THERAPEUTIC EXERCISES: CPT

## 2017-04-17 NOTE — PROGRESS NOTES
Dx: Tension HA L cervical/scap mm spasms             Authorized # of Visits:  MMAI           Subjective Pain comes and goes; has some now in her L neck and arm.  Overall, reports symptom and functional limitation improvement from extreme pain/difficulty to C2-C6 x2 min TRACEE lateral glides 2x20s ea C2-C6  Lateral glides for foramen gapping C2-C6 x2 min R/L - Lateral glides for foramen gapping C2-C6 2x20s R; L rotational mobs 2x20s -2x20s R      -2x20s ea L   Trigger point release suboccipitals x2 min (varied m Timed Treatment: 35 min  Total Treatment Time: 35 min

## 2017-04-19 ENCOUNTER — OFFICE VISIT (OUTPATIENT)
Dept: PHYSICAL THERAPY | Age: 77
End: 2017-04-19
Attending: Other
Payer: MEDICARE

## 2017-04-19 PROCEDURE — 97140 MANUAL THERAPY 1/> REGIONS: CPT

## 2017-04-19 NOTE — PROGRESS NOTES
Discharge Summary  Pt has attended 12 visits in Physical Therapy.    Dx: Tension HA L cervical/scap mm spasms             Authorized # of Visits:  MMAI           Subjective/Assessment: Ruperto Bowie reports feeling much better from start of care and has made great continued compliance to HEP    Patient/Family/Caregiver was advised of these findings, precautions, and treatment options and has agreed to actively participate in planning and for this course of care.     Thank you for your referral. If you have any questi LS, and Scalene stretch ea   shld rolls x10 (sore L, not R) Median N manual glides x10 OH low trap against wall x10 x10 1# stack in eye level shelf x5 R/L 1# scap retract x10 -   Kinesiotape L SCM inhibition L UT inhibition Small ball hand off behind back

## 2017-05-03 DIAGNOSIS — R51.9 ACUTE NONINTRACTABLE HEADACHE, UNSPECIFIED HEADACHE TYPE: Primary | ICD-10-CM

## 2017-05-04 RX ORDER — TOPIRAMATE 50 MG/1
TABLET, FILM COATED ORAL
Qty: 30 TABLET | Refills: 0 | Status: SHIPPED | OUTPATIENT
Start: 2017-05-04 | End: 2019-05-29

## 2017-05-04 NOTE — TELEPHONE ENCOUNTER
Medication: topiramate    Date of last refill: 2/2/17  [place]    Last office visit: 2/17/17  Due back to clinic per last office note:  3 months  Date next office visit scheduled:  Future Appointments  Date Time Obdulia

## 2017-05-16 ENCOUNTER — OFFICE VISIT (OUTPATIENT)
Dept: NEUROLOGY | Facility: CLINIC | Age: 77
End: 2017-05-16

## 2017-05-16 VITALS
SYSTOLIC BLOOD PRESSURE: 133 MMHG | HEART RATE: 90 BPM | DIASTOLIC BLOOD PRESSURE: 69 MMHG | RESPIRATION RATE: 16 BRPM | BODY MASS INDEX: 26.02 KG/M2 | WEIGHT: 145 LBS | HEIGHT: 62.5 IN

## 2017-05-16 DIAGNOSIS — G44.209 TENSION-TYPE HEADACHE, NOT INTRACTABLE, UNSPECIFIED CHRONICITY PATTERN: ICD-10-CM

## 2017-05-16 DIAGNOSIS — M79.18 CERVICAL MYOFASCIAL PAIN SYNDROME: Primary | ICD-10-CM

## 2017-05-16 PROCEDURE — 99213 OFFICE O/P EST LOW 20 MIN: CPT | Performed by: OTHER

## 2017-05-16 NOTE — PROGRESS NOTES
[facility] with [address]  1/18/1940  Primary Care Provider:  Christine Cox    5/16/2017    69year old yo patient being seen for:  Headache, TA biopsy negative    No more headach Disp: , Rfl:   •  pioglitazone (ACTOS) 45 MG Oral Tab, Take 45 mg by mouth daily. , Disp: , Rfl:   •  metformin (GLUCOPHAGE) 1000 MG Oral Tab, Take 1,000 mg by mouth 2 (two) times daily with meals. , Disp: , Rfl:   PRN:     Allergies:  No Known Allergies

## 2017-05-16 NOTE — PATIENT INSTRUCTIONS
Refill policies:    • Allow 2 business days for refills; controlled substances may take longer.   • Contact your pharmacy at least 5 days prior to running out of medication and have them send an electronic request or submit request through the “request re insurance carrier to obtain pre-certification or prior authorization. Unfortunately, [facility] has seen an increase in denial of payment even though the procedure/test has been pre-certified.   You are strongly encouraged to contact your insurance carrier to v

## 2017-08-15 ENCOUNTER — HOSPITAL ENCOUNTER (INPATIENT)
Facility: HOSPITAL | Age: 77
LOS: 3 days | Discharge: HOME | DRG: 391 | End: 2017-08-19
Attending: EMERGENCY MEDICINE | Admitting: HOSPITALIST
Payer: MEDICARE

## 2017-08-15 ENCOUNTER — APPOINTMENT (OUTPATIENT)
Dept: GENERAL RADIOLOGY | Facility: HOSPITAL | Age: 77
DRG: 391 | End: 2017-08-15
Attending: EMERGENCY MEDICINE
Payer: MEDICARE

## 2017-08-15 DIAGNOSIS — E16.2 HYPOGLYCEMIA: ICD-10-CM

## 2017-08-15 DIAGNOSIS — E86.0 DEHYDRATION: ICD-10-CM

## 2017-08-15 DIAGNOSIS — N17.9 ACUTE RENAL FAILURE, UNSPECIFIED ACUTE RENAL FAILURE TYPE (HCC): ICD-10-CM

## 2017-08-15 DIAGNOSIS — R11.2 NAUSEA AND VOMITING IN ADULT: Primary | ICD-10-CM

## 2017-08-15 LAB
ALBUMIN SERPL-MCNC: 3.6 G/DL (ref 3.5–4.8)
ALP LIVER SERPL-CCNC: 70 U/L (ref 55–142)
ALT SERPL-CCNC: 18 U/L (ref 14–54)
AST SERPL-CCNC: 16 U/L (ref 15–41)
BASOPHILS # BLD AUTO: 0.03 X10(3) UL (ref 0–0.1)
BASOPHILS NFR BLD AUTO: 0.4 %
BILIRUB SERPL-MCNC: 0.2 MG/DL (ref 0.1–2)
BUN BLD-MCNC: 24 MG/DL (ref 8–20)
CALCIUM BLD-MCNC: 9.1 MG/DL (ref 8.3–10.3)
CHLORIDE: 92 MMOL/L (ref 101–111)
CO2: 22 MMOL/L (ref 22–32)
CREAT BLD-MCNC: 3.94 MG/DL (ref 0.55–1.02)
EOSINOPHIL # BLD AUTO: 0.08 X10(3) UL (ref 0–0.3)
EOSINOPHIL NFR BLD AUTO: 1 %
ERYTHROCYTE [DISTWIDTH] IN BLOOD BY AUTOMATED COUNT: 14.8 % (ref 11.5–16)
GLUCOSE BLD-MCNC: 211 MG/DL (ref 65–99)
GLUCOSE BLD-MCNC: 33 MG/DL (ref 65–99)
GLUCOSE BLD-MCNC: 41 MG/DL (ref 70–99)
HCT VFR BLD AUTO: 31 % (ref 34–50)
HGB BLD-MCNC: 10.2 G/DL (ref 12–16)
IMMATURE GRANULOCYTE COUNT: 0.02 X10(3) UL (ref 0–1)
IMMATURE GRANULOCYTE RATIO %: 0.3 %
LYMPHOCYTES # BLD AUTO: 2.78 X10(3) UL (ref 0.9–4)
LYMPHOCYTES NFR BLD AUTO: 35.4 %
M PROTEIN MFR SERPL ELPH: 7.5 G/DL (ref 6.1–8.3)
MCH RBC QN AUTO: 25.2 PG (ref 27–33.2)
MCHC RBC AUTO-ENTMCNC: 32.9 G/DL (ref 31–37)
MCV RBC AUTO: 76.7 FL (ref 81–100)
MONOCYTES # BLD AUTO: 0.85 X10(3) UL (ref 0.1–0.6)
MONOCYTES NFR BLD AUTO: 10.8 %
NEUTROPHIL ABS PRELIM: 4.09 X10 (3) UL (ref 1.3–6.7)
NEUTROPHILS # BLD AUTO: 4.09 X10(3) UL (ref 1.3–6.7)
NEUTROPHILS NFR BLD AUTO: 52.1 %
PLATELET # BLD AUTO: 277 10(3)UL (ref 150–450)
POTASSIUM SERPL-SCNC: 4.4 MMOL/L (ref 3.6–5.1)
RBC # BLD AUTO: 4.04 X10(6)UL (ref 3.8–5.1)
RED CELL DISTRIBUTION WIDTH-SD: 41.1 FL (ref 35.1–46.3)
SODIUM SERPL-SCNC: 126 MMOL/L (ref 136–144)
WBC # BLD AUTO: 7.9 X10(3) UL (ref 4–13)

## 2017-08-15 PROCEDURE — 71010 XR CHEST AP PORTABLE  (CPT=71010): CPT | Performed by: EMERGENCY MEDICINE

## 2017-08-15 PROCEDURE — 99223 1ST HOSP IP/OBS HIGH 75: CPT | Performed by: HOSPITALIST

## 2017-08-15 RX ORDER — ONDANSETRON 2 MG/ML
4 INJECTION INTRAMUSCULAR; INTRAVENOUS ONCE
Status: COMPLETED | OUTPATIENT
Start: 2017-08-15 | End: 2017-08-15

## 2017-08-15 RX ORDER — DILTIAZEM HCL 90 MG
90 TABLET ORAL NIGHTLY
COMMUNITY
End: 2018-04-19 | Stop reason: ALTCHOICE

## 2017-08-15 RX ORDER — DOCUSATE SODIUM 100 MG/1
100 CAPSULE, LIQUID FILLED ORAL 2 TIMES DAILY
COMMUNITY
End: 2018-11-26

## 2017-08-15 RX ORDER — DEXTROSE MONOHYDRATE 25 G/50ML
50 INJECTION, SOLUTION INTRAVENOUS ONCE
Status: COMPLETED | OUTPATIENT
Start: 2017-08-15 | End: 2017-08-15

## 2017-08-15 RX ORDER — DEXTROSE MONOHYDRATE 25 G/50ML
INJECTION, SOLUTION INTRAVENOUS
Status: DISCONTINUED
Start: 2017-08-15 | End: 2017-08-16

## 2017-08-15 RX ORDER — CHOLECALCIFEROL (VITAMIN D3) 1250 MCG
1 CAPSULE ORAL WEEKLY
COMMUNITY
End: 2018-11-26 | Stop reason: ALTCHOICE

## 2017-08-15 RX ORDER — HYDROCODONE BITARTRATE AND ACETAMINOPHEN 5; 325 MG/1; MG/1
1 TABLET ORAL EVERY 6 HOURS PRN
Status: ON HOLD | COMMUNITY
End: 2019-08-23

## 2017-08-15 RX ORDER — GABAPENTIN 300 MG/1
300 CAPSULE ORAL 2 TIMES DAILY
COMMUNITY
End: 2018-12-11

## 2017-08-16 PROBLEM — E86.0 DEHYDRATION: Status: ACTIVE | Noted: 2017-08-16

## 2017-08-16 PROBLEM — E16.2 HYPOGLYCEMIA: Status: ACTIVE | Noted: 2017-08-16

## 2017-08-16 PROBLEM — N17.9 ACUTE RENAL FAILURE, UNSPECIFIED ACUTE RENAL FAILURE TYPE (HCC): Status: ACTIVE | Noted: 2017-08-16

## 2017-08-16 PROBLEM — N17.9 ACUTE RENAL FAILURE (HCC): Status: ACTIVE | Noted: 2017-08-16

## 2017-08-16 LAB
ATRIAL RATE: 75 BPM
BILIRUB UR QL STRIP.AUTO: NEGATIVE
BUN BLD-MCNC: 24 MG/DL (ref 8–20)
CALCIUM BLD-MCNC: 8 MG/DL (ref 8.3–10.3)
CHLORIDE: 96 MMOL/L (ref 101–111)
CO2: 21 MMOL/L (ref 22–32)
COLOR UR AUTO: YELLOW
CREAT BLD-MCNC: 4.12 MG/DL (ref 0.55–1.02)
EST. AVERAGE GLUCOSE BLD GHB EST-MCNC: 163 MG/DL (ref 68–126)
GLUCOSE BLD-MCNC: 132 MG/DL (ref 65–99)
GLUCOSE BLD-MCNC: 133 MG/DL (ref 65–99)
GLUCOSE BLD-MCNC: 225 MG/DL (ref 65–99)
GLUCOSE BLD-MCNC: 231 MG/DL (ref 65–99)
GLUCOSE BLD-MCNC: 66 MG/DL (ref 70–99)
GLUCOSE BLD-MCNC: 85 MG/DL (ref 65–99)
GLUCOSE UR STRIP.AUTO-MCNC: NEGATIVE MG/DL
HBA1C MFR BLD HPLC: 7.3 % (ref ?–5.7)
KETONES UR STRIP.AUTO-MCNC: NEGATIVE MG/DL
LEUKOCYTE ESTERASE UR QL STRIP.AUTO: NEGATIVE
NITRITE UR QL STRIP.AUTO: NEGATIVE
P AXIS: 53 DEGREES
P-R INTERVAL: 184 MS
PH UR STRIP.AUTO: 5 [PH] (ref 4.5–8)
POTASSIUM SERPL-SCNC: 4.5 MMOL/L (ref 3.6–5.1)
PROT UR STRIP.AUTO-MCNC: NEGATIVE MG/DL
Q-T INTERVAL: 388 MS
QRS DURATION: 76 MS
QTC CALCULATION (BEZET): 433 MS
R AXIS: 21 DEGREES
RBC UR QL AUTO: NEGATIVE
SODIUM SERPL-SCNC: 127 MMOL/L (ref 136–144)
SP GR UR STRIP.AUTO: 1.01 (ref 1–1.03)
T AXIS: 41 DEGREES
UROBILINOGEN UR STRIP.AUTO-MCNC: <2 MG/DL
VENTRICULAR RATE: 75 BPM

## 2017-08-16 PROCEDURE — 99232 SBSQ HOSP IP/OBS MODERATE 35: CPT | Performed by: INTERNAL MEDICINE

## 2017-08-16 PROCEDURE — 99222 1ST HOSP IP/OBS MODERATE 55: CPT | Performed by: INTERNAL MEDICINE

## 2017-08-16 RX ORDER — ROPINIROLE 1 MG/1
1 TABLET, FILM COATED ORAL NIGHTLY
Status: DISCONTINUED | OUTPATIENT
Start: 2017-08-16 | End: 2017-08-19

## 2017-08-16 RX ORDER — ACETAMINOPHEN 325 MG/1
650 TABLET ORAL EVERY 6 HOURS PRN
Status: DISCONTINUED | OUTPATIENT
Start: 2017-08-16 | End: 2017-08-19

## 2017-08-16 RX ORDER — SENNOSIDES 8.6 MG
8.6 TABLET ORAL 2 TIMES DAILY
Status: DISCONTINUED | OUTPATIENT
Start: 2017-08-16 | End: 2017-08-19

## 2017-08-16 RX ORDER — ASPIRIN 81 MG/1
81 TABLET, CHEWABLE ORAL DAILY
Status: DISCONTINUED | OUTPATIENT
Start: 2017-08-16 | End: 2017-08-19

## 2017-08-16 RX ORDER — ONDANSETRON 2 MG/ML
4 INJECTION INTRAMUSCULAR; INTRAVENOUS EVERY 4 HOURS PRN
Status: DISCONTINUED | OUTPATIENT
Start: 2017-08-16 | End: 2017-08-19

## 2017-08-16 RX ORDER — DEXTROSE MONOHYDRATE 25 G/50ML
50 INJECTION, SOLUTION INTRAVENOUS
Status: DISCONTINUED | OUTPATIENT
Start: 2017-08-16 | End: 2017-08-19

## 2017-08-16 RX ORDER — METOPROLOL SUCCINATE 50 MG/1
50 TABLET, EXTENDED RELEASE ORAL
Status: DISCONTINUED | OUTPATIENT
Start: 2017-08-16 | End: 2017-08-19

## 2017-08-16 RX ORDER — TOPIRAMATE 25 MG/1
50 TABLET ORAL NIGHTLY
Status: DISCONTINUED | OUTPATIENT
Start: 2017-08-16 | End: 2017-08-19

## 2017-08-16 RX ORDER — GABAPENTIN 300 MG/1
300 CAPSULE ORAL 2 TIMES DAILY
Status: DISCONTINUED | OUTPATIENT
Start: 2017-08-16 | End: 2017-08-19

## 2017-08-16 RX ORDER — DEXTROSE AND SODIUM CHLORIDE 5; .45 G/100ML; G/100ML
INJECTION, SOLUTION INTRAVENOUS CONTINUOUS
Status: DISCONTINUED | OUTPATIENT
Start: 2017-08-16 | End: 2017-08-16

## 2017-08-16 RX ORDER — POLYETHYLENE GLYCOL 3350 17 G/17G
17 POWDER, FOR SOLUTION ORAL DAILY
Status: DISCONTINUED | OUTPATIENT
Start: 2017-08-16 | End: 2017-08-19

## 2017-08-16 RX ORDER — HEPARIN SODIUM 5000 [USP'U]/ML
5000 INJECTION, SOLUTION INTRAVENOUS; SUBCUTANEOUS EVERY 8 HOURS SCHEDULED
Status: DISCONTINUED | OUTPATIENT
Start: 2017-08-16 | End: 2017-08-19

## 2017-08-16 RX ORDER — LATANOPROST 50 UG/ML
1 SOLUTION/ DROPS OPHTHALMIC NIGHTLY
Status: DISCONTINUED | OUTPATIENT
Start: 2017-08-16 | End: 2017-08-19

## 2017-08-16 RX ORDER — DEXTROSE AND SODIUM CHLORIDE 5; .9 G/100ML; G/100ML
INJECTION, SOLUTION INTRAVENOUS CONTINUOUS
Status: DISCONTINUED | OUTPATIENT
Start: 2017-08-16 | End: 2017-08-17

## 2017-08-16 RX ORDER — FAMOTIDINE 20 MG/1
20 TABLET ORAL NIGHTLY
Status: DISCONTINUED | OUTPATIENT
Start: 2017-08-16 | End: 2017-08-19

## 2017-08-16 RX ORDER — TRAMADOL HYDROCHLORIDE 50 MG/1
50 TABLET ORAL EVERY 12 HOURS
Status: DISCONTINUED | OUTPATIENT
Start: 2017-08-16 | End: 2017-08-19

## 2017-08-16 NOTE — ED PROVIDER NOTES
Patient Seen in: [facility] Emergency Department    History   Patient presents with:  Nausea/Vomiting/Diarrhea (gastrointestinal)    Stated Complaint: vomiting     HPI    This is a 64-year-old female with past medical history of anemia, glaucoma, di BY MOUTH EVERY NIGHT AT BEDTIME   ergocalciferol 65927 units Oral Cap,  Take 1 capsule by mouth once a week. GlipiZIDE ER 10 MG Oral Tablet 24 Hr,  Take 1 tablet by mouth 2 (two) times daily.      TraMADol HCl 50 MG Oral Tab,  Take 1 tablet by mouth every [08/15/17 2200]  BP: (!) 161/83  Pulse: 81  Resp: 16  Temp: 97.7 °F (36.5 °C)  Temp src: Temporal  SpO2: 96 %  O2 Device: None (Room air)    Current:/66   Pulse 72   Temp 97.7 °F (36.5 °C) (Temporal)   Resp 17   Ht 160 cm (5' 3\")   Wt 68 kg   SpO2 9 ------                     CBC W/ DIFFERENTIAL[[id number]]          Abnormal            Final result                 Please view results for these tests on the individual orders.    URINALYSIS WITH CULTURE REFLEX   URINALYSIS WITH CULTURE REFLEX   Doretha Sera 211.  CBC: White blood cell count 7.5. Hemoglobin 10.2 which is chronic. Platelet 714. CMP: BUN 24. Creatinine 3.94 which is above her baseline. Glucose 41 as above. Sodium 126. Chest x-ray showed no acute process.   EKG was normal.  Patient will be

## 2017-08-16 NOTE — OCCUPATIONAL THERAPY NOTE
OCCUPATIONAL THERAPY EVALUATION - INPATIENT     Room Number: 431/431-A  Evaluation Date: 8/16/2017  Type of Evaluation: Initial  Presenting Problem: nausea/vomiting    Physician Order: IP Consult to Occupational Therapy  Reason for Therapy: ADL/IADL Dysfun PAIN ASSESSMENT  Ratin  Location: none reported       COGNITION  Arousal/Alertness:  lethargic  Following Commands:  follows one step commands with increased time    VISION  Current Vision: glaucoma    PERCEPTION  Overall Perception Status:   WFL - reach;RN aware of session/findings; All patient questions and concerns addressed; Family present; Discussed recommendations with /    ASSESSMENT     Patient is a 69year old female admitted 8/15/2017 for nausea and vomiting.  The AM-PA

## 2017-08-16 NOTE — ED INITIAL ASSESSMENT (HPI)
Patient arrives with daughter who states patient has been vomiting for 2 days and unable to keep any food down. Patient's daughter also states patient has not urinated today.

## 2017-08-16 NOTE — CM/SW NOTE
08/16/17 1100   CM/SW Referral Data   Referral Source Social Work (self-referral)   Reason for Referral Discharge planning   Informant Children   Patient Info   Patient's Mental Status Alert;Oriented   Patient Communication Issues Language barrier   Toni Godfrey

## 2017-08-16 NOTE — PROGRESS NOTES
[facility] Pharmacy Note: Renal dose adjustment for Famotidine (Pepcid)  Virginia Barrera has been prescribed Famotidine (Pepcid) 20 mg every 12 hours. Estimated Creatinine Clearance: 10 mL/min (based on SCr of 3.94 mg/dL).     Her calculated creatinine c

## 2017-08-16 NOTE — PROGRESS NOTES
[facility]PITAL[facility]  Progress Note     Gabriella Garvin Patient Status:  Inpatient    1940 MRN [id number]   [facility] 4NW-A Attending Mary Ann Marquez MD   Hosp Day # 0 [place] [id number] [facility]     Chief Complaint: N/V    S: Patient without acu Oral BID   • Metoprolol Succinate ER  50 mg Oral Daily Beta Blocker   • ROPINIRole HCl  1 mg Oral Nightly   • topiramate  50 mg Oral Nightly   • TraMADol HCl  50 mg Oral Q12H   • latanoprost  1 drop Both Eyes Nightly       ASSESSMENT / PLAN:     Maxine Kennedy

## 2017-08-16 NOTE — PROGRESS NOTES
Pharmacy Note: Renal dose adjustment for Tramadol (Ultram)  Jose C Teran has been prescribed Tramadol (Ultram)  50 mg orally every 6 hours as needed for pain. Estimated Creatinine Clearance: 10 mL/min (based on SCr of 3.94 mg/dL).     Her calculated cr

## 2017-08-16 NOTE — H&P
[facility]PITAL[facility]  History and Physical     Larance Fuelling Patient Status:  Emergency    1940 MRN [id number]   Location [address] Attending Ismael Resendez, [address] [facility] Day # 0 [facility]     Chief Complaint: N/V po MOUTH EVERY NIGHT AT BEDTIME Disp: 30 tablet Rfl: 0   ergocalciferol 20779 units Oral Cap Take 1 capsule by mouth once a week. Disp:  Rfl: 11   GlipiZIDE ER 10 MG Oral Tablet 24 Hr Take 1 tablet by mouth 2 (two) times daily.    Disp:  Rfl: 6   TraMADol HCl bruits. Respiratory: Good inspiratory effort. Clear to auscultation bilaterally. No rhonchi. Cardiovascular: S1, S2. Regular rhythm. Regular rate. No murmurs, rubs or gallops. Equal pulses. Chest and Back: No tenderness or deformity.   Abdomen: Soft, n

## 2017-08-16 NOTE — PLAN OF CARE
Patient received this am alert and oriented, resting in bed, daughter at bedside. Lungs clear to auscultation, denies SOB and cough, room air. Abdomen soft, hypoactive bowel sounds, complaints of constipation, no BM x 4 days.  Miralax and colace ordered, to

## 2017-08-16 NOTE — PHYSICAL THERAPY NOTE
PHYSICAL THERAPY EVALUATION - INPATIENT     Room Number: 431/431-A  Evaluation Date: 8/16/2017  Type of Evaluation: Initial  Physician Order: PT Eval and Treat    Presenting Problem: N/V  Reason for Therapy: Mobility Dysfunction and Discharge Planning functional limits     BALANCE  Static Sitting: Good  Dynamic Sitting: Good  Static Standing: Fair -  Dynamic Standing: Poor +    ADDITIONAL TESTS                                    NEUROLOGICAL FINDINGS                      ACTIVITY TOLERANCE  Room air  No Patient is a 69year old female admitted on 8/15/2017 for nausea/vomiting. Pertinent comorbidities and personal factors impacting therapy include glaucoma.   In this PT evaluation, the patient presents with the following impairments: decreased balance

## 2017-08-16 NOTE — PAYOR COMM NOTE
--------------  ADMISSION REVIEW       8/16    ED                Patient presents with:  Nausea/Vomiting/Diarrhea     Stated Complaint: vomiting         This is a 31-year-old female with past medical history of anemia, glaucoma, diabetes, hypertension   wh following:      POC Glucose 211 (*)       All other components within normal limits   POCT GLUCOSE - Abnormal; Notable for the following:      POC Glucose 132 (*)       All other components within normal limits   CBC W/ DIFFERENTIAL - Abnormal; Notable for

## 2017-08-16 NOTE — PLAN OF CARE
NURSING ADMISSION NOTE      Patient admitted via Cart  Oriented to room. Safety precautions initiated. Bed in low position. Call light in reach. Patient is alert and oriented x4. Admission navigators completed.  Patient speaks Eunice Cocking

## 2017-08-17 ENCOUNTER — APPOINTMENT (OUTPATIENT)
Dept: ULTRASOUND IMAGING | Facility: HOSPITAL | Age: 77
DRG: 391 | End: 2017-08-17
Attending: INTERNAL MEDICINE
Payer: MEDICARE

## 2017-08-17 LAB
BASOPHILS # BLD AUTO: 0.01 X10(3) UL (ref 0–0.1)
BASOPHILS NFR BLD AUTO: 0.2 %
BUN BLD-MCNC: 25 MG/DL (ref 8–20)
BUN BLD-MCNC: 25 MG/DL (ref 8–20)
CALCIUM BLD-MCNC: 7.6 MG/DL (ref 8.3–10.3)
CALCIUM BLD-MCNC: 7.7 MG/DL (ref 8.3–10.3)
CHLORIDE: 89 MMOL/L (ref 101–111)
CHLORIDE: 94 MMOL/L (ref 101–111)
CO2: 18 MMOL/L (ref 22–32)
CO2: 25 MMOL/L (ref 22–32)
CREAT BLD-MCNC: 4.31 MG/DL (ref 0.55–1.02)
CREAT BLD-MCNC: 4.85 MG/DL (ref 0.55–1.02)
EOSINOPHIL # BLD AUTO: 0.06 X10(3) UL (ref 0–0.3)
EOSINOPHIL NFR BLD AUTO: 0.9 %
ERYTHROCYTE [DISTWIDTH] IN BLOOD BY AUTOMATED COUNT: 14.8 % (ref 11.5–16)
GLUCOSE BLD-MCNC: 153 MG/DL (ref 70–99)
GLUCOSE BLD-MCNC: 170 MG/DL (ref 70–99)
GLUCOSE BLD-MCNC: 186 MG/DL (ref 65–99)
GLUCOSE BLD-MCNC: 208 MG/DL (ref 65–99)
GLUCOSE BLD-MCNC: 208 MG/DL (ref 65–99)
GLUCOSE BLD-MCNC: 293 MG/DL (ref 65–99)
HCT VFR BLD AUTO: 26.4 % (ref 34–50)
HGB BLD-MCNC: 8.7 G/DL (ref 12–16)
IMMATURE GRANULOCYTE COUNT: 0.02 X10(3) UL (ref 0–1)
IMMATURE GRANULOCYTE RATIO %: 0.3 %
LYMPHOCYTES # BLD AUTO: 2.63 X10(3) UL (ref 0.9–4)
LYMPHOCYTES NFR BLD AUTO: 39.7 %
MCH RBC QN AUTO: 25.4 PG (ref 27–33.2)
MCHC RBC AUTO-ENTMCNC: 33 G/DL (ref 31–37)
MCV RBC AUTO: 77.2 FL (ref 81–100)
MONOCYTES # BLD AUTO: 0.84 X10(3) UL (ref 0.1–0.6)
MONOCYTES NFR BLD AUTO: 12.7 %
NEUTROPHIL ABS PRELIM: 3.07 X10 (3) UL (ref 1.3–6.7)
NEUTROPHILS # BLD AUTO: 3.07 X10(3) UL (ref 1.3–6.7)
NEUTROPHILS NFR BLD AUTO: 46.2 %
OSMOLALITY URINE: 179 MOSM/KG (ref 300–1300)
PLATELET # BLD AUTO: 220 10(3)UL (ref 150–450)
POTASSIUM SERPL-SCNC: 4.4 MMOL/L (ref 3.6–5.1)
POTASSIUM SERPL-SCNC: 5 MMOL/L (ref 3.6–5.1)
RBC # BLD AUTO: 3.42 X10(6)UL (ref 3.8–5.1)
RED CELL DISTRIBUTION WIDTH-SD: 41.4 FL (ref 35.1–46.3)
SODIUM SERPL-SCNC: 122 MMOL/L (ref 136–144)
SODIUM SERPL-SCNC: 123 MMOL/L (ref 136–144)
SODIUM SERPL-SCNC: 60 MMOL/L
TSI SER-ACNC: 0.39 MIU/ML (ref 0.35–5.5)
URIC ACID: 7.5 MG/DL (ref 2.4–8)
WBC # BLD AUTO: 6.6 X10(3) UL (ref 4–13)

## 2017-08-17 PROCEDURE — 76770 US EXAM ABDO BACK WALL COMP: CPT | Performed by: INTERNAL MEDICINE

## 2017-08-17 PROCEDURE — 99232 SBSQ HOSP IP/OBS MODERATE 35: CPT | Performed by: INTERNAL MEDICINE

## 2017-08-17 NOTE — CM/SW NOTE
Marilee Truong,  requested this writer to send [facility] order, F2F, and clinical info to [address] [facility] via [address] which was done. Left a message for intake at [facility] [phone] to call case management.  Would like to confirm

## 2017-08-17 NOTE — PROGRESS NOTES
[facility]  Nephrology Progress Note    Oleksandr Burgess Attending:  Daphne Cox MD       Assessment and Plan:    1) BUCKY- c/w ATN due to volume depletion due to anorexia / vomiting + ACE-I effect + recent NSAIDS; no other insults noted.  Meds are 08/17/2017   CREATSERUM 4.85 08/17/2017   BUN 25 08/17/2017    08/17/2017   K 5.0 08/17/2017   CL 94 08/17/2017   CO2 18.0 08/17/2017    08/17/2017   CA 7.6 08/17/2017   PGLU 186 08/17/2017       Imaging: All imaging studies reviewed.     Meds

## 2017-08-17 NOTE — PROGRESS NOTES
[facility]ITAL[facility]  Progress Note     Virginia Barrera Patient Status:  Inpatient    1940 MRN [id number]   [facility] 4NW-A Attending Steph Robertson MD   Hosp Day # 1 [facility] [address] [facility]     Chief Complaint: N/V    S: Patient without acu AC and HS   • aspirin  81 mg Oral Daily   • DilTIAZem HCl  90 mg Oral Nightly   • famoTIDine  20 mg Oral Nightly   • gabapentin  300 mg Oral BID   • Metoprolol Succinate ER  50 mg Oral Daily Beta Blocker   • ROPINIRole HCl  1 mg Oral Nightly   • topiramate

## 2017-08-17 NOTE — PLAN OF CARE
GASTROINTESTINAL - ADULT    • Maintains or returns to baseline bowel function Progressing        GENITOURINARY - ADULT    • Absence of urinary retention Progressing        Impaired Activities of Daily Living    • Achieve highest/safest level of independenc

## 2017-08-17 NOTE — CM/SW NOTE
Pt and pt's daughter Bianca Harman agreeable to home health care. Informed pt's daughter the pt would have limited options due to insurance. Pt's daughter did not have a preference. SW to send referral to [facility].

## 2017-08-17 NOTE — CONSULTS
[facility]  Report of Consultation    Barbi Raker Patient Status:  Inpatient    1940 MRN [id number]   [facility] 4NW-A Attending Evangelina An MD   Hosp Day # 0 PCP ALYSE Babcock       Assessment / Plan:    1) BUCKY- c/w ATN d 4-0.006 g chewable tab 8 tablet, 8 tablet, Oral, Q15 Min PRN  •  dextrose 5 % and 0.9 % NaCl infusion, , Intravenous, Continuous  •  Heparin Sodium (Porcine) 5000 UNIT/ML injection 5,000 Units, 5,000 Units, Subcutaneous, Q8H [facility]  •  acetaminophen (TYLENOL) normal, no murmur, rub, or gallop  Lungs: Decreased breath sounds at the bases bilaterally.    Abdomen: Soft, non-tender. + bowel sounds, no palpable organomegaly  Extremities: Without clubbing, cyanosis; no edema  Neurologic: Cranial nerves grossly intact,

## 2017-08-18 ENCOUNTER — APPOINTMENT (OUTPATIENT)
Dept: CT IMAGING | Facility: HOSPITAL | Age: 77
DRG: 391 | End: 2017-08-18
Attending: INTERNAL MEDICINE
Payer: MEDICARE

## 2017-08-18 LAB
BASOPHILS # BLD AUTO: 0.01 X10(3) UL (ref 0–0.1)
BASOPHILS NFR BLD AUTO: 0.2 %
BUN BLD-MCNC: 24 MG/DL (ref 8–20)
CALCIUM BLD-MCNC: 8.4 MG/DL (ref 8.3–10.3)
CHLORIDE: 88 MMOL/L (ref 101–111)
CO2: 30 MMOL/L (ref 22–32)
CREAT BLD-MCNC: 3.7 MG/DL (ref 0.55–1.02)
EOSINOPHIL # BLD AUTO: 0.14 X10(3) UL (ref 0–0.3)
EOSINOPHIL NFR BLD AUTO: 2.9 %
ERYTHROCYTE [DISTWIDTH] IN BLOOD BY AUTOMATED COUNT: 14.5 % (ref 11.5–16)
GLUCOSE BLD-MCNC: 119 MG/DL (ref 65–99)
GLUCOSE BLD-MCNC: 130 MG/DL (ref 65–99)
GLUCOSE BLD-MCNC: 146 MG/DL (ref 70–99)
GLUCOSE BLD-MCNC: 151 MG/DL (ref 65–99)
GLUCOSE BLD-MCNC: 155 MG/DL (ref 65–99)
GLUCOSE BLD-MCNC: 179 MG/DL (ref 65–99)
HCT VFR BLD AUTO: 26.3 % (ref 34–50)
HGB BLD-MCNC: 8.8 G/DL (ref 12–16)
IMMATURE GRANULOCYTE COUNT: 0.01 X10(3) UL (ref 0–1)
IMMATURE GRANULOCYTE RATIO %: 0.2 %
LYMPHOCYTES # BLD AUTO: 2.24 X10(3) UL (ref 0.9–4)
LYMPHOCYTES NFR BLD AUTO: 46.5 %
MCH RBC QN AUTO: 25.2 PG (ref 27–33.2)
MCHC RBC AUTO-ENTMCNC: 33.5 G/DL (ref 31–37)
MCV RBC AUTO: 75.4 FL (ref 81–100)
MONOCYTES # BLD AUTO: 0.66 X10(3) UL (ref 0.1–0.6)
MONOCYTES NFR BLD AUTO: 13.7 %
NEUTROPHIL ABS PRELIM: 1.76 X10 (3) UL (ref 1.3–6.7)
NEUTROPHILS # BLD AUTO: 1.76 X10(3) UL (ref 1.3–6.7)
NEUTROPHILS NFR BLD AUTO: 36.5 %
PLATELET # BLD AUTO: 211 10(3)UL (ref 150–450)
POTASSIUM SERPL-SCNC: 3.8 MMOL/L (ref 3.6–5.1)
RBC # BLD AUTO: 3.49 X10(6)UL (ref 3.8–5.1)
RED CELL DISTRIBUTION WIDTH-SD: 39.1 FL (ref 35.1–46.3)
SODIUM SERPL-SCNC: 127 MMOL/L (ref 136–144)
WBC # BLD AUTO: 4.8 X10(3) UL (ref 4–13)

## 2017-08-18 PROCEDURE — 99233 SBSQ HOSP IP/OBS HIGH 50: CPT | Performed by: INTERNAL MEDICINE

## 2017-08-18 PROCEDURE — 99232 SBSQ HOSP IP/OBS MODERATE 35: CPT | Performed by: INTERNAL MEDICINE

## 2017-08-18 PROCEDURE — 70450 CT HEAD/BRAIN W/O DYE: CPT | Performed by: INTERNAL MEDICINE

## 2017-08-18 RX ORDER — POTASSIUM CHLORIDE 20 MEQ/1
20 TABLET, EXTENDED RELEASE ORAL ONCE
Status: COMPLETED | OUTPATIENT
Start: 2017-08-18 | End: 2017-08-18

## 2017-08-18 RX ORDER — SODIUM CHLORIDE 9 MG/ML
INJECTION, SOLUTION INTRAVENOUS CONTINUOUS
Status: DISCONTINUED | OUTPATIENT
Start: 2017-08-18 | End: 2017-08-19

## 2017-08-18 NOTE — PHYSICAL THERAPY NOTE
PHYSICAL THERAPY TREATMENT NOTE - INPATIENT    Room Number: 431/431-A     Session: 1   Number of Visits to Meet Established Goals: 5    Presenting Problem: N/V    Problem List  Principal Problem:    Nausea and vomiting in adult  Active Problems:    Dehydr patient currently need. ..   -   Moving to and from a bed to a chair (including a wheelchair)?: A Little   -   Need to walk in hospital room?: A Little   -   Climbing 3-5 steps with a railing?: A Little       AM-PAC Score:  Raw Score: 18   PT Approx Degree this session to translate and reports this is not patient's baseline. RN informed. At this time, Pt. presents with decreased balance, impaired strength, difficulty with gait/transfers resulting in downgrade of overall functional mobility.   Due to above d

## 2017-08-18 NOTE — PROGRESS NOTES
[facility]  Progress Note     Sowmya Sam Patient Status:  Inpatient    1940 MRN [id number]   [facility] 4NW-A Attending Luis Buck MD   Hosp Day # 2 PCP 30[address] [facility]     Chief Complaint: N/V    S: Patient without acu mg/dL). No results for input(s): PTP, INR in the last 72 hours. No results for input(s): TROP, CK in the last 72 hours. Imaging: Imaging data reviewed in Epic.     Medications:   • Heparin Sodium (Porcine)  5,000 Units Subcutaneous Q8H [facility] long-term   •

## 2017-08-18 NOTE — OCCUPATIONAL THERAPY NOTE
OCCUPATIONAL THERAPY TREATMENT NOTE - INPATIENT     Room Number: 431/431-A  Session: 1   Number of Visits to Meet Established Goals: 3    Presenting Problem: nausea/vomiting    History related to current admission: Pt admitted 8/15/2017 for Dehydration [E8 taking off regular upper body clothing?: A Little  -   Taking care of personal grooming such as brushing teeth?: A Little  -   Eating meals?: A Little    AM-PAC Score:  Score: 18  Approx Degree of Impairment: 46.65%  Standardized Score (AM-PAC Scale): 38. 6 perform all ADLs: with supervision and with good judgement/safety     Functional Transfer Goals  Patient will perform all functional transfers:  with supervision and with good judgement/safety     UE Exercise Program Goal  Patient will be supervision with

## 2017-08-18 NOTE — CM/SW NOTE
SW spoke w/Mary from [facility] who confirmed they are able to accept the pt. Informed her the pt will be discharged today. Informed ehr to contact the pt's daughter Monty Abbasi who she lives w/to schedule the [facility] visit as pt is primarily [place] speaking.  Mary Nova

## 2017-08-18 NOTE — PLAN OF CARE
GENITOURINARY - ADULT    • Absence of urinary retention Progressing        Impaired Functional Mobility    • Achieve highest/safest level of mobility/gait Progressing        PAIN - ADULT    • Verbalizes/displays adequate comfort level or patient's stated p

## 2017-08-18 NOTE — PROGRESS NOTES
[facility]  Nephrology Progress Note    Emeli Knee Attending:  Cruzito Marin MD       Assessment and Plan:    1) BUCKY- c/w ATN due to volume depletion due to anorexia / vomiting + ACE-I effect + recent NSAIDS. UA bland; US unremarkable.  Improv .0 08/18/2017   CREATSERUM 3.70 08/18/2017   BUN 24 08/18/2017    08/18/2017   K 3.8 08/18/2017   CL 88 08/18/2017   CO2 30.0 08/18/2017    08/18/2017   CA 8.4 08/18/2017   PGLU 155 08/18/2017       Imaging:   All imaging studies revie

## 2017-08-19 VITALS
SYSTOLIC BLOOD PRESSURE: 119 MMHG | HEART RATE: 62 BPM | TEMPERATURE: 98 F | OXYGEN SATURATION: 96 % | BODY MASS INDEX: 25.99 KG/M2 | RESPIRATION RATE: 18 BRPM | WEIGHT: 146.69 LBS | HEIGHT: 63 IN | DIASTOLIC BLOOD PRESSURE: 59 MMHG

## 2017-08-19 LAB
BASOPHILS # BLD AUTO: 0.02 X10(3) UL (ref 0–0.1)
BASOPHILS NFR BLD AUTO: 0.4 %
BUN BLD-MCNC: 21 MG/DL (ref 8–20)
CALCIUM BLD-MCNC: 8.4 MG/DL (ref 8.3–10.3)
CHLORIDE: 95 MMOL/L (ref 101–111)
CO2: 26 MMOL/L (ref 22–32)
CREAT BLD-MCNC: 2.57 MG/DL (ref 0.55–1.02)
EOSINOPHIL # BLD AUTO: 0.13 X10(3) UL (ref 0–0.3)
EOSINOPHIL NFR BLD AUTO: 2.3 %
ERYTHROCYTE [DISTWIDTH] IN BLOOD BY AUTOMATED COUNT: 15 % (ref 11.5–16)
GLUCOSE BLD-MCNC: 132 MG/DL (ref 65–99)
GLUCOSE BLD-MCNC: 133 MG/DL (ref 70–99)
GLUCOSE BLD-MCNC: 268 MG/DL (ref 65–99)
HCT VFR BLD AUTO: 28.7 % (ref 34–50)
HGB BLD-MCNC: 9.4 G/DL (ref 12–16)
IMMATURE GRANULOCYTE COUNT: 0.01 X10(3) UL (ref 0–1)
IMMATURE GRANULOCYTE RATIO %: 0.2 %
LYMPHOCYTES # BLD AUTO: 1.87 X10(3) UL (ref 0.9–4)
LYMPHOCYTES NFR BLD AUTO: 33.3 %
MCH RBC QN AUTO: 25.5 PG (ref 27–33.2)
MCHC RBC AUTO-ENTMCNC: 32.8 G/DL (ref 31–37)
MCV RBC AUTO: 77.8 FL (ref 81–100)
MONOCYTES # BLD AUTO: 0.68 X10(3) UL (ref 0.1–0.6)
MONOCYTES NFR BLD AUTO: 12.1 %
NEUTROPHIL ABS PRELIM: 2.91 X10 (3) UL (ref 1.3–6.7)
NEUTROPHILS # BLD AUTO: 2.91 X10(3) UL (ref 1.3–6.7)
NEUTROPHILS NFR BLD AUTO: 51.7 %
PLATELET # BLD AUTO: 220 10(3)UL (ref 150–450)
POTASSIUM SERPL-SCNC: 3.8 MMOL/L (ref 3.6–5.1)
RBC # BLD AUTO: 3.69 X10(6)UL (ref 3.8–5.1)
RED CELL DISTRIBUTION WIDTH-SD: 41.7 FL (ref 35.1–46.3)
SODIUM SERPL-SCNC: 131 MMOL/L (ref 136–144)
WBC # BLD AUTO: 5.6 X10(3) UL (ref 4–13)

## 2017-08-19 PROCEDURE — 99239 HOSP IP/OBS DSCHRG MGMT >30: CPT | Performed by: INTERNAL MEDICINE

## 2017-08-19 PROCEDURE — 99233 SBSQ HOSP IP/OBS HIGH 50: CPT | Performed by: INTERNAL MEDICINE

## 2017-08-19 RX ORDER — PIOGLITAZONEHYDROCHLORIDE 45 MG/1
45 TABLET ORAL DAILY
Qty: 30 TABLET | Refills: 0 | Status: SHIPPED | OUTPATIENT
Start: 2017-08-19 | End: 2018-10-29

## 2017-08-19 NOTE — PROGRESS NOTES
Confused and forgetful at times. Pulled out IV when family left bedside. Dtr to stay overnight. Roa d/c'd at 0930. Pt voided x1 unmeasured. Voided 2nd time 600cc. Denies abd pain. No nausea or voming.

## 2017-08-19 NOTE — PROGRESS NOTES
[facility]  Progress Note     Shorty Barnett Patient Status:  Inpatient    1940 MRN [id number]   [facility] [place] [place] 4NW-A Attending Sheri Waller MD   Hosp Day # 3 [place] [id number] [facility]     Chief Complaint: N/V    S: Patient without acu Oral Nightly   • gabapentin  300 mg Oral BID   • Metoprolol Succinate ER  50 mg Oral Daily Beta Blocker   • ROPINIRole HCl  1 mg Oral Nightly   • topiramate  50 mg Oral Nightly   • TraMADol HCl  50 mg Oral Q12H   • latanoprost  1 drop Both Eyes Nightly   •

## 2017-08-19 NOTE — PROGRESS NOTES
[facility]  Nephrology Progress Note    Josiane Gonzalez Attending:  Duke Christie MD       Assessment and Plan:    1) BUCKY- c/w ATN due to volume depletion due to anorexia / vomiting + ACE-I effect + recent NSAIDS.  UA bland; US unremarkable- improv WBC 5.6 08/19/2017   HGB 9.4 08/19/2017   HCT 28.7 08/19/2017   .0 08/19/2017   CREATSERUM 2.57 08/19/2017   BUN 21 08/19/2017    08/19/2017   K 3.8 08/19/2017   CL 95 08/19/2017   CO2 26.0 08/19/2017    08/19/2017   CA 8.4 08/19/2017

## 2017-08-19 NOTE — PAYOR COMM NOTE
--------------  CONTINUED STAY REVIEW    Payor: [address] #:  [id number]  Authorization Number: N/A    Admit date: 8/16/17  Admit time: [address]    Admitting Physician: Joann Odell MD  Attending Physician:  Federico Vang MD  Prim Date Action Dose Route User    8/18/2017 2106 Given 1 drop Both Eyes Mayra Boone RN      Metoprolol Succinate ER (Toprol XL) 24 hr tab 50 mg     Date Action Dose Route User    8/19/2017 0616 Given 50 mg Oral Magdalena Boone, RN      PEG 3350 (LINN HSQ  · CODE status: Full  · Roa: none  Central line: none  ------------  Nephrology plan  Assessment and Plan:     1) BUCKY- c/w ATN due to volume depletion due to anorexia / vomiting + ACE-I effect + recent NSAIDS. UA bland; US unremarkable- improving.  Wi

## 2017-08-19 NOTE — DISCHARGE SUMMARY
[facility] HOSPITALIST  DISCHARGE SUMMARY     Barbi Wright Patient Status:  Inpatient    1940 MRN [id number]   [facility] 4NW-A Attending Evangelina An MD   [facility] Day # 3 PCP [place]     Date of Admission: 8/15/2017  Date of Disc to her decreased fluid status. She slowly responded to IVF and her ACE was stopped. She was tolerating diet prior to DC. She will need to get repeat labs this upcoming week to monitor improvement.  Her DM meds were adjusted given her renal failure, she will Take 50 mg by mouth daily. Indications: High Blood Pressure, 1 TAB DAILY   Refills:  0     Pioglitazone HCl 45 MG Tabs  Commonly known as:  ACTOS      Take 1 tablet (45 mg total) by mouth daily.    Quantity:  30 tablet  Refills:  0     TOBRADEX 0.3-0.1 % Guerra provider specified.     Vital signs:  Temp:  [98.2 °F (36.8 °C)-98.7 °F (37.1 °C)] 98.2 °F (36.8 °C)  Pulse:  [62-76] 62  Resp:  [18-20] 18  BP: (119-150)/(59-71) 119/59      ----------------------------------------------------------------------------------

## 2017-08-19 NOTE — PLAN OF CARE
Patient received this am alert and oriented x 2-3, daughter at bedside. Lungs clear on room air, denies SOB and cough. Abdomen soft, bowel sounds present, passing flatus. Voiding adequate amounts, asymptomatic. IVF infusing into left hand IV site.     13:00

## 2017-08-24 ENCOUNTER — HOSPITAL ENCOUNTER (EMERGENCY)
Facility: HOSPITAL | Age: 77
Discharge: HOME | End: 2017-08-24
Attending: EMERGENCY MEDICINE
Payer: MEDICARE

## 2017-08-24 ENCOUNTER — APPOINTMENT (OUTPATIENT)
Dept: CT IMAGING | Facility: HOSPITAL | Age: 77
End: 2017-08-24
Attending: EMERGENCY MEDICINE
Payer: MEDICARE

## 2017-08-24 ENCOUNTER — APPOINTMENT (OUTPATIENT)
Dept: GENERAL RADIOLOGY | Facility: HOSPITAL | Age: 77
End: 2017-08-24
Attending: EMERGENCY MEDICINE
Payer: MEDICARE

## 2017-08-24 VITALS
BODY MASS INDEX: 26 KG/M2 | HEART RATE: 85 BPM | RESPIRATION RATE: 24 BRPM | OXYGEN SATURATION: 97 % | DIASTOLIC BLOOD PRESSURE: 115 MMHG | WEIGHT: 146.63 LBS | TEMPERATURE: 97 F | SYSTOLIC BLOOD PRESSURE: 142 MMHG

## 2017-08-24 DIAGNOSIS — N30.00 ACUTE CYSTITIS WITHOUT HEMATURIA: Primary | ICD-10-CM

## 2017-08-24 DIAGNOSIS — D64.9 ANEMIA, UNSPECIFIED TYPE: ICD-10-CM

## 2017-08-24 LAB
ALBUMIN SERPL-MCNC: 3.6 G/DL (ref 3.5–4.8)
ALP LIVER SERPL-CCNC: 77 U/L (ref 55–142)
ALT SERPL-CCNC: 19 U/L (ref 14–54)
APTT PPP: 35.4 SECONDS (ref 25–34)
AST SERPL-CCNC: 20 U/L (ref 15–41)
ATRIAL RATE: 70 BPM
BASOPHILS # BLD AUTO: 0.02 X10(3) UL (ref 0–0.1)
BASOPHILS NFR BLD AUTO: 0.3 %
BILIRUB SERPL-MCNC: 0.3 MG/DL (ref 0.1–2)
BILIRUB UR QL STRIP.AUTO: NEGATIVE
BUN BLD-MCNC: 19 MG/DL (ref 8–20)
CALCIUM BLD-MCNC: 9.6 MG/DL (ref 8.3–10.3)
CHLORIDE: 103 MMOL/L (ref 101–111)
CO2: 23 MMOL/L (ref 22–32)
COLOR UR AUTO: YELLOW
CREAT BLD-MCNC: 1.5 MG/DL (ref 0.55–1.02)
EOSINOPHIL # BLD AUTO: 0.15 X10(3) UL (ref 0–0.3)
EOSINOPHIL NFR BLD AUTO: 1.9 %
ERYTHROCYTE [DISTWIDTH] IN BLOOD BY AUTOMATED COUNT: 15.2 % (ref 11.5–16)
GLUCOSE BLD-MCNC: 158 MG/DL (ref 65–99)
GLUCOSE BLD-MCNC: 32 MG/DL (ref 70–99)
GLUCOSE BLD-MCNC: 34 MG/DL (ref 65–99)
GLUCOSE UR STRIP.AUTO-MCNC: 150 MG/DL
HCT VFR BLD AUTO: 30.9 % (ref 34–50)
HGB BLD-MCNC: 9.8 G/DL (ref 12–16)
IMMATURE GRANULOCYTE COUNT: 0.02 X10(3) UL (ref 0–1)
IMMATURE GRANULOCYTE RATIO %: 0.3 %
INR BLD: 1.05 (ref 0.89–1.11)
KETONES UR STRIP.AUTO-MCNC: NEGATIVE MG/DL
LYMPHOCYTES # BLD AUTO: 2.79 X10(3) UL (ref 0.9–4)
LYMPHOCYTES NFR BLD AUTO: 35.6 %
M PROTEIN MFR SERPL ELPH: 8 G/DL (ref 6.1–8.3)
MCH RBC QN AUTO: 25.1 PG (ref 27–33.2)
MCHC RBC AUTO-ENTMCNC: 31.7 G/DL (ref 31–37)
MCV RBC AUTO: 79.2 FL (ref 81–100)
MONOCYTES # BLD AUTO: 0.75 X10(3) UL (ref 0.1–0.6)
MONOCYTES NFR BLD AUTO: 9.6 %
NEUTROPHIL ABS PRELIM: 4.11 X10 (3) UL (ref 1.3–6.7)
NEUTROPHILS # BLD AUTO: 4.11 X10(3) UL (ref 1.3–6.7)
NEUTROPHILS NFR BLD AUTO: 52.3 %
NITRITE UR QL STRIP.AUTO: NEGATIVE
P AXIS: 57 DEGREES
P-R INTERVAL: 180 MS
PH UR STRIP.AUTO: 6 [PH] (ref 4.5–8)
PLATELET # BLD AUTO: 294 10(3)UL (ref 150–450)
POTASSIUM SERPL-SCNC: 4.3 MMOL/L (ref 3.6–5.1)
PROT UR STRIP.AUTO-MCNC: NEGATIVE MG/DL
PSA SERPL DL<=0.01 NG/ML-MCNC: 13.7 SECONDS (ref 12–14.3)
Q-T INTERVAL: 390 MS
QRS DURATION: 76 MS
QTC CALCULATION (BEZET): 421 MS
R AXIS: 31 DEGREES
RBC # BLD AUTO: 3.9 X10(6)UL (ref 3.8–5.1)
RBC #/AREA URNS AUTO: >10 /HPF
RED CELL DISTRIBUTION WIDTH-SD: 43.6 FL (ref 35.1–46.3)
SODIUM SERPL-SCNC: 135 MMOL/L (ref 136–144)
SP GR UR STRIP.AUTO: 1.01 (ref 1–1.03)
T AXIS: 48 DEGREES
UROBILINOGEN UR STRIP.AUTO-MCNC: <2 MG/DL
VENTRICULAR RATE: 70 BPM
WBC # BLD AUTO: 7.8 X10(3) UL (ref 4–13)
WBC #/AREA URNS AUTO: >50 /HPF
WBC CLUMPS UR QL AUTO: PRESENT

## 2017-08-24 PROCEDURE — 93005 ELECTROCARDIOGRAM TRACING: CPT

## 2017-08-24 PROCEDURE — 85025 COMPLETE CBC W/AUTO DIFF WBC: CPT | Performed by: EMERGENCY MEDICINE

## 2017-08-24 PROCEDURE — 96360 HYDRATION IV INFUSION INIT: CPT

## 2017-08-24 PROCEDURE — 87186 SC STD MICRODIL/AGAR DIL: CPT | Performed by: EMERGENCY MEDICINE

## 2017-08-24 PROCEDURE — 85610 PROTHROMBIN TIME: CPT | Performed by: EMERGENCY MEDICINE

## 2017-08-24 PROCEDURE — 99285 EMERGENCY DEPT VISIT HI MDM: CPT

## 2017-08-24 PROCEDURE — 87086 URINE CULTURE/COLONY COUNT: CPT | Performed by: EMERGENCY MEDICINE

## 2017-08-24 PROCEDURE — 85730 THROMBOPLASTIN TIME PARTIAL: CPT | Performed by: EMERGENCY MEDICINE

## 2017-08-24 PROCEDURE — 80053 COMPREHEN METABOLIC PANEL: CPT | Performed by: EMERGENCY MEDICINE

## 2017-08-24 PROCEDURE — 80053 COMPREHEN METABOLIC PANEL: CPT

## 2017-08-24 PROCEDURE — 96361 HYDRATE IV INFUSION ADD-ON: CPT

## 2017-08-24 PROCEDURE — 70450 CT HEAD/BRAIN W/O DYE: CPT | Performed by: EMERGENCY MEDICINE

## 2017-08-24 PROCEDURE — 82962 GLUCOSE BLOOD TEST: CPT

## 2017-08-24 PROCEDURE — 71020 XR CHEST PA + LAT CHEST (CPT=71020): CPT | Performed by: EMERGENCY MEDICINE

## 2017-08-24 PROCEDURE — 93010 ELECTROCARDIOGRAM REPORT: CPT

## 2017-08-24 PROCEDURE — 85025 COMPLETE CBC W/AUTO DIFF WBC: CPT

## 2017-08-24 PROCEDURE — 87077 CULTURE AEROBIC IDENTIFY: CPT | Performed by: EMERGENCY MEDICINE

## 2017-08-24 PROCEDURE — 81001 URINALYSIS AUTO W/SCOPE: CPT | Performed by: EMERGENCY MEDICINE

## 2017-08-24 RX ORDER — DEXTROSE MONOHYDRATE 25 G/50ML
INJECTION, SOLUTION INTRAVENOUS
Status: COMPLETED
Start: 2017-08-24 | End: 2017-08-24

## 2017-08-24 RX ORDER — SODIUM CHLORIDE 9 MG/ML
INJECTION, SOLUTION INTRAVENOUS ONCE
Status: COMPLETED | OUTPATIENT
Start: 2017-08-24 | End: 2017-08-24

## 2017-08-24 RX ORDER — SULFAMETHOXAZOLE AND TRIMETHOPRIM 800; 160 MG/1; MG/1
1 TABLET ORAL EVERY 12 HOURS SCHEDULED
Status: DISCONTINUED | OUTPATIENT
Start: 2017-08-24 | End: 2017-08-24

## 2017-08-24 RX ORDER — SULFAMETHOXAZOLE AND TRIMETHOPRIM 800; 160 MG/1; MG/1
1 TABLET ORAL 2 TIMES DAILY
Qty: 20 TABLET | Refills: 0 | Status: SHIPPED | OUTPATIENT
Start: 2017-08-24 | End: 2017-09-03

## 2017-08-24 NOTE — ED INITIAL ASSESSMENT (HPI)
Pt to ED for abdominal pain, N/V, headache and leg pain.  Pt recently hospitalized for N/V - dehydration

## 2017-08-24 NOTE — ED PROVIDER NOTES
Patient Seen in: [facility] Emergency Department    History   Patient presents with:  Abdomen/Flank Pain (GI/)    Stated Complaint: abdominal pain, fatigue    HPI    67-year-old [place] woman who comes in complaining of multiple things.   She has h mouth every 6 (six) hours. famoTIDine 20 MG Oral Tab,  Take 20 mg by mouth 2 (two) times daily. Diphenhydramine-APAP, sleep, (TYLENOL PM EXTRA STRENGTH)  MG Oral Tab,  Take by mouth.    tobramycin-dexamethasone (TOBRADEX) 0.3-0.1 % Ophthalmic Susp oriented and nonfocal   Skin: no rashes or nodules         ED Course     Labs Reviewed   URINALYSIS WITH CULTURE REFLEX - Abnormal; Notable for the following:        Result Value    Clarity Urine Cloudy (*)     Glucose Urine 150  (*)     Blood Urine Small DRAW GOLD   RAINBOW DRAW LAVENDER   RAINBOW DRAW LIGHT GREEN   URINE CULTURE, ROUTINE     EKG    Rate, intervals and axes as noted on EKG Report.   Rate: 70  Rhythm: Sinus Rhythm  Reading: No acute ST-T changes         Xr Chest Pa + Lat Chest (cpt=71020)

## 2017-09-13 NOTE — PAYOR COMM NOTE
--------------  DISCHARGE REVIEW    Payor: [address] [place] [address] #:  [id number]  Authorization Number: N/A    Admit date: 8/16/17  Admit time:  5327  Discharge Date: 8/19/2017  1:15 PM     Admitting Physician: Augusto Ellison MD  Attending Physi

## 2018-04-19 ENCOUNTER — APPOINTMENT (OUTPATIENT)
Dept: GENERAL RADIOLOGY | Facility: HOSPITAL | Age: 78
End: 2018-04-19
Attending: EMERGENCY MEDICINE
Payer: MEDICARE

## 2018-04-19 ENCOUNTER — HOSPITAL ENCOUNTER (EMERGENCY)
Facility: HOSPITAL | Age: 78
Discharge: HOME | End: 2018-04-19
Attending: EMERGENCY MEDICINE
Payer: MEDICARE

## 2018-04-19 VITALS
HEART RATE: 66 BPM | OXYGEN SATURATION: 100 % | BODY MASS INDEX: 34 KG/M2 | TEMPERATURE: 99 F | SYSTOLIC BLOOD PRESSURE: 150 MMHG | WEIGHT: 194 LBS | DIASTOLIC BLOOD PRESSURE: 64 MMHG | RESPIRATION RATE: 18 BRPM

## 2018-04-19 DIAGNOSIS — E87.1 HYPONATREMIA: Primary | ICD-10-CM

## 2018-04-19 DIAGNOSIS — B34.9 VIRAL SYNDROME: ICD-10-CM

## 2018-04-19 PROCEDURE — 84145 PROCALCITONIN (PCT): CPT | Performed by: EMERGENCY MEDICINE

## 2018-04-19 PROCEDURE — 93005 ELECTROCARDIOGRAM TRACING: CPT

## 2018-04-19 PROCEDURE — 99285 EMERGENCY DEPT VISIT HI MDM: CPT

## 2018-04-19 PROCEDURE — 85025 COMPLETE CBC W/AUTO DIFF WBC: CPT | Performed by: EMERGENCY MEDICINE

## 2018-04-19 PROCEDURE — 71046 X-RAY EXAM CHEST 2 VIEWS: CPT | Performed by: EMERGENCY MEDICINE

## 2018-04-19 PROCEDURE — 93010 ELECTROCARDIOGRAM REPORT: CPT

## 2018-04-19 PROCEDURE — 84450 TRANSFERASE (AST) (SGOT): CPT | Performed by: EMERGENCY MEDICINE

## 2018-04-19 PROCEDURE — 96360 HYDRATION IV INFUSION INIT: CPT

## 2018-04-19 PROCEDURE — 80053 COMPREHEN METABOLIC PANEL: CPT | Performed by: EMERGENCY MEDICINE

## 2018-04-19 PROCEDURE — 84132 ASSAY OF SERUM POTASSIUM: CPT | Performed by: EMERGENCY MEDICINE

## 2018-04-19 PROCEDURE — 81003 URINALYSIS AUTO W/O SCOPE: CPT | Performed by: EMERGENCY MEDICINE

## 2018-04-19 RX ORDER — SODIUM CHLORIDE 9 MG/ML
125 INJECTION, SOLUTION INTRAVENOUS CONTINUOUS
Status: DISCONTINUED | OUTPATIENT
Start: 2018-04-19 | End: 2018-04-20

## 2018-04-19 RX ORDER — LISINOPRIL 20 MG/1
20 TABLET ORAL 2 TIMES DAILY
COMMUNITY
End: 2019-03-19

## 2018-04-20 NOTE — ED INITIAL ASSESSMENT (HPI)
Pt has had a fever for the past week of about 100-101. Pt complains of sore throat and also dysuria.       Pt did have anti-pyretic 30 minutes ago

## 2018-04-20 NOTE — ED PROVIDER NOTES
Patient Seen in: [facility] Emergency Department    History   Patient presents with:  Fever (infectious)  Sore Throat    Stated Complaint: FEVER    HPI    This is a 81-year-old female past medical history of anemia, dehydration, glaucoma, diabetes, Wt 88 kg   SpO2 100%   BMI 34.37 kg/m²         Physical Exam    GENERAL: Awake, alert oriented x3, nontoxic appearing. SKIN: Normal, warm, and dry. HEENT:  Pupils equally round and reactive to light. Conjuctiva clear.   Oropharynx is clear and moist. Rhythm  Reading: No acute changes           ED Course as of Apr 19 2208  ------------------------------------------------------------   Xr Chest Pa + Lat Chest (cpt=71046)    Result Date: 4/19/2018  PROCEDURE:  XR CHEST PA + LAT CHEST (CPT=71046)  [facility] visit.     Follow-up:  Thiago Hernández 55 [phone]    In 1 day          Medications Prescribed:  Current Discharge Medication List

## 2018-10-15 ENCOUNTER — OFFICE VISIT (OUTPATIENT)
Dept: INTERNAL MEDICINE CLINIC | Facility: CLINIC | Age: 78
End: 2018-10-15
Payer: MEDICARE

## 2018-10-15 ENCOUNTER — LAB ENCOUNTER (OUTPATIENT)
Dept: LAB | Age: 78
End: 2018-10-15
Attending: INTERNAL MEDICINE
Payer: MEDICARE

## 2018-10-15 VITALS
RESPIRATION RATE: 20 BRPM | WEIGHT: 176.75 LBS | OXYGEN SATURATION: 97 % | HEART RATE: 78 BPM | SYSTOLIC BLOOD PRESSURE: 166 MMHG | HEIGHT: 62 IN | DIASTOLIC BLOOD PRESSURE: 82 MMHG | TEMPERATURE: 98 F | BODY MASS INDEX: 32.52 KG/M2

## 2018-10-15 DIAGNOSIS — E11.8 TYPE 2 DIABETES MELLITUS WITH COMPLICATION, WITHOUT LONG-TERM CURRENT USE OF INSULIN (HCC): ICD-10-CM

## 2018-10-15 DIAGNOSIS — Z00.00 LABORATORY EXAMINATION ORDERED AS PART OF A ROUTINE GENERAL MEDICAL EXAMINATION: ICD-10-CM

## 2018-10-15 DIAGNOSIS — I10 ESSENTIAL HYPERTENSION: Primary | ICD-10-CM

## 2018-10-15 PROBLEM — E86.0 DEHYDRATION: Status: RESOLVED | Noted: 2017-08-16 | Resolved: 2018-10-15

## 2018-10-15 PROBLEM — Z91.81 AT RISK FOR FALLING: Status: RESOLVED | Noted: 2017-01-23 | Resolved: 2018-10-15

## 2018-10-15 PROBLEM — E16.2 HYPOGLYCEMIA: Status: RESOLVED | Noted: 2017-08-16 | Resolved: 2018-10-15

## 2018-10-15 PROBLEM — R11.2 NAUSEA AND VOMITING IN ADULT: Status: RESOLVED | Noted: 2017-08-15 | Resolved: 2018-10-15

## 2018-10-15 PROCEDURE — 99214 OFFICE O/P EST MOD 30 MIN: CPT | Performed by: INTERNAL MEDICINE

## 2018-10-15 PROCEDURE — 80053 COMPREHEN METABOLIC PANEL: CPT

## 2018-10-15 PROCEDURE — 93000 ELECTROCARDIOGRAM COMPLETE: CPT | Performed by: INTERNAL MEDICINE

## 2018-10-15 PROCEDURE — 83036 HEMOGLOBIN GLYCOSYLATED A1C: CPT

## 2018-10-15 PROCEDURE — 82043 UR ALBUMIN QUANTITATIVE: CPT

## 2018-10-15 PROCEDURE — 84443 ASSAY THYROID STIM HORMONE: CPT

## 2018-10-15 PROCEDURE — 85025 COMPLETE CBC W/AUTO DIFF WBC: CPT

## 2018-10-15 PROCEDURE — 82306 VITAMIN D 25 HYDROXY: CPT

## 2018-10-15 PROCEDURE — 36415 COLL VENOUS BLD VENIPUNCTURE: CPT

## 2018-10-15 PROCEDURE — 84436 ASSAY OF TOTAL THYROXINE: CPT

## 2018-10-15 PROCEDURE — 82570 ASSAY OF URINE CREATININE: CPT

## 2018-10-15 PROCEDURE — 81001 URINALYSIS AUTO W/SCOPE: CPT

## 2018-10-15 PROCEDURE — G0008 ADMIN INFLUENZA VIRUS VAC: HCPCS | Performed by: INTERNAL MEDICINE

## 2018-10-15 PROCEDURE — 80061 LIPID PANEL: CPT

## 2018-10-15 PROCEDURE — 90653 IIV ADJUVANT VACCINE IM: CPT | Performed by: INTERNAL MEDICINE

## 2018-10-15 RX ORDER — ALOGLIPTIN 25 MG/1
1 TABLET, FILM COATED ORAL DAILY
Refills: 6 | COMMUNITY
Start: 2018-09-21 | End: 2019-02-05

## 2018-10-15 RX ORDER — LANCETS
EACH MISCELLANEOUS
Refills: 5 | COMMUNITY
Start: 2018-10-09 | End: 2019-02-05

## 2018-10-15 RX ORDER — BLOOD SUGAR DIAGNOSTIC
STRIP MISCELLANEOUS
Refills: 11 | COMMUNITY
Start: 2018-09-21 | End: 2019-01-10

## 2018-10-15 RX ORDER — ISOPROPYL ALCOHOL 0.75 G/1
SWAB TOPICAL
Refills: 3 | COMMUNITY
Start: 2018-09-21 | End: 2019-02-05

## 2018-10-15 RX ORDER — GLIPIZIDE 10 MG/1
1 TABLET, FILM COATED, EXTENDED RELEASE ORAL DAILY
Refills: 12 | COMMUNITY
Start: 2018-09-21 | End: 2018-11-12

## 2018-10-15 RX ORDER — BIOTIN 1 MG
TABLET ORAL
COMMUNITY
Start: 2015-02-04 | End: 2019-03-21

## 2018-10-15 RX ORDER — PIOGLITAZONEHYDROCHLORIDE 30 MG/1
1 TABLET ORAL DAILY
Refills: 12 | COMMUNITY
Start: 2018-09-21 | End: 2019-02-05

## 2018-10-15 RX ORDER — TIMOLOL MALEATE 5 MG/ML
SOLUTION/ DROPS OPHTHALMIC
Refills: 10 | COMMUNITY
Start: 2018-09-21

## 2018-10-15 NOTE — PATIENT INSTRUCTIONS
Patient unsure about her medications will need to verify all her medicines in person  More than 15 minutes was spent clearing up the patient's problem list of redundant entries which have not been updated by the previous physicians

## 2018-10-15 NOTE — PROGRESS NOTES
Barbi Wright  1940 is a 69year old female. Patient presents with:  Establish Care      HPI:   Here to establish herself. Extremely poor historian with a language barrier. History obtained via .   Does not know who her previous PC needed for Pain. Disp:  Rfl:    TOPIRAMATE 50 MG Oral Tab TAKE 1 TABLET BY MOUTH EVERY NIGHT AT BEDTIME Disp: 30 tablet Rfl: 0   ergocalciferol 75493 units Oral Cap Take 1 capsule by mouth once a week.  Disp:  Rfl: 11   TraMADol HCl 50 MG Oral Tab Take 1 ta no colds, no discharge, no hay fever, no sinus trouble. Mouth and Pharynx no sore throats, no hoarseness. Neck no lumps, no goiter, no neck stiffness or pain. Endocrine:   Diabetes yes long-standing control of which is unknown-. Thyroid disorder none. normal, bilaterally . Sclera: normal.   Turbinates: normal.   NECK:   Carotid bruit: none. Cervical lymph nodes: unremarkable. JVD: none. Range of Motion: normal.   Thyroid: unremarkable. HEART:   Clicks: absent .    Heart sounds: normal.   Murmur

## 2018-10-23 ENCOUNTER — HOSPITAL ENCOUNTER (OUTPATIENT)
Dept: MAMMOGRAPHY | Age: 78
Discharge: HOME | End: 2018-10-23
Attending: INTERNAL MEDICINE
Payer: MEDICARE

## 2018-10-23 ENCOUNTER — HOSPITAL ENCOUNTER (OUTPATIENT)
Dept: BONE DENSITY | Age: 78
Discharge: HOME | End: 2018-10-23
Attending: INTERNAL MEDICINE
Payer: MEDICARE

## 2018-10-23 DIAGNOSIS — Z00.00 LABORATORY EXAMINATION ORDERED AS PART OF A ROUTINE GENERAL MEDICAL EXAMINATION: ICD-10-CM

## 2018-10-23 PROCEDURE — 77063 BREAST TOMOSYNTHESIS BI: CPT | Performed by: INTERNAL MEDICINE

## 2018-10-23 PROCEDURE — 77080 DXA BONE DENSITY AXIAL: CPT | Performed by: INTERNAL MEDICINE

## 2018-10-23 PROCEDURE — 77067 SCR MAMMO BI INCL CAD: CPT | Performed by: INTERNAL MEDICINE

## 2018-10-28 PROBLEM — N17.9 ACUTE RENAL FAILURE (HCC): Status: RESOLVED | Noted: 2017-08-16 | Resolved: 2018-10-28

## 2018-10-28 PROBLEM — M47.22 CERVICAL SPONDYLOSIS WITH RADICULOPATHY: Status: RESOLVED | Noted: 2017-02-17 | Resolved: 2018-10-28

## 2018-10-28 PROBLEM — M79.18 CERVICAL MYOFASCIAL PAIN SYNDROME: Status: RESOLVED | Noted: 2017-02-17 | Resolved: 2018-10-28

## 2018-10-29 ENCOUNTER — TELEPHONE (OUTPATIENT)
Dept: INTERNAL MEDICINE CLINIC | Facility: CLINIC | Age: 78
End: 2018-10-29

## 2018-10-29 PROBLEM — R79.89 ELEVATED SERUM CREATININE: Status: ACTIVE | Noted: 2018-10-29

## 2018-10-29 PROBLEM — E78.00 HYPERCHOLESTEREMIA: Status: ACTIVE | Noted: 2018-10-29

## 2018-10-29 NOTE — TELEPHONE ENCOUNTER
MD Curry Fonseca RN             Patient needs to bring all her medicines in during follow-up      Left message for patient to call office back.

## 2018-10-30 ENCOUNTER — HOSPITAL ENCOUNTER (OUTPATIENT)
Dept: ULTRASOUND IMAGING | Age: 78
Discharge: HOME | End: 2018-10-30
Attending: INTERNAL MEDICINE
Payer: MEDICARE

## 2018-10-30 ENCOUNTER — HOSPITAL ENCOUNTER (OUTPATIENT)
Dept: MAMMOGRAPHY | Age: 78
Discharge: HOME | End: 2018-10-30
Attending: INTERNAL MEDICINE
Payer: MEDICARE

## 2018-10-30 DIAGNOSIS — R92.2 INCONCLUSIVE MAMMOGRAM: ICD-10-CM

## 2018-10-30 PROCEDURE — 77065 DX MAMMO INCL CAD UNI: CPT | Performed by: INTERNAL MEDICINE

## 2018-10-30 PROCEDURE — 76642 ULTRASOUND BREAST LIMITED: CPT | Performed by: INTERNAL MEDICINE

## 2018-10-30 PROCEDURE — 77061 BREAST TOMOSYNTHESIS UNI: CPT | Performed by: INTERNAL MEDICINE

## 2018-10-30 NOTE — PROGRESS NOTES
Normal-Impression    CONCLUSION:     BIRADS Code 2: BENIGN FINDING.       RECOMMENDATIONS:    FOLLOW-UP: BILATERAL BREASTS.  One year follow-up mammogram

## 2018-11-08 ENCOUNTER — TELEPHONE (OUTPATIENT)
Dept: INTERNAL MEDICINE CLINIC | Facility: CLINIC | Age: 78
End: 2018-11-08

## 2018-11-12 ENCOUNTER — TELEPHONE (OUTPATIENT)
Dept: INTERNAL MEDICINE CLINIC | Facility: CLINIC | Age: 78
End: 2018-11-12

## 2018-11-12 ENCOUNTER — OFFICE VISIT (OUTPATIENT)
Dept: INTERNAL MEDICINE CLINIC | Facility: CLINIC | Age: 78
End: 2018-11-12
Payer: MEDICARE

## 2018-11-12 VITALS
OXYGEN SATURATION: 95 % | DIASTOLIC BLOOD PRESSURE: 80 MMHG | RESPIRATION RATE: 20 BRPM | SYSTOLIC BLOOD PRESSURE: 138 MMHG | TEMPERATURE: 98 F | BODY MASS INDEX: 33.58 KG/M2 | WEIGHT: 182.5 LBS | HEIGHT: 62 IN | HEART RATE: 90 BPM

## 2018-11-12 DIAGNOSIS — I10 ESSENTIAL HYPERTENSION: ICD-10-CM

## 2018-11-12 DIAGNOSIS — E11.8 TYPE 2 DIABETES MELLITUS WITH COMPLICATION, WITHOUT LONG-TERM CURRENT USE OF INSULIN (HCC): Primary | ICD-10-CM

## 2018-11-12 PROCEDURE — 99213 OFFICE O/P EST LOW 20 MIN: CPT | Performed by: INTERNAL MEDICINE

## 2018-11-12 RX ORDER — GLIPIZIDE 10 MG/1
10 TABLET ORAL
COMMUNITY
End: 2019-01-05

## 2018-11-12 NOTE — TELEPHONE ENCOUNTER
Signed form/Rx for diabetic shoes along with most recent OVN was faxed to [phone] @ [phone].

## 2018-11-12 NOTE — PROGRESS NOTES
Bentley [facility] 1940 is a 69year old female. Patient presents with:   Follow - Up       HPI:   Patient here because of a request for foot wear for diabetes    Current Outpatient Medications:  glipiZIDE 10 MG Oral Tab Take 10 mg by mouth 2 (two) Rfl: TOPIRAMATE 50 MG Oral Tab TAKE 1 TABLET BY MOUTH EVERY NIGHT AT BEDTIME Disp: 30 tablet Rfl: 0   ergocalciferol 75938 units Oral Cap Take 1 capsule by mouth once a week.  Disp:  Rfl: 11   famoTIDine 20 MG Oral Tab Take 20 mg by mouth 2 (two) times d unremarkable. Mouth: unremarkable. Nasal septum: midline. Pharynx: normal.   Sinuses: non-tender. HEART:   Clicks: no.   Distal Pulses Palpable: yes. Edema: none visible . Gallop: no .   Heart sounds: normal S1S2. Murmurs: none.    Rhythm: reg

## 2018-11-14 PROBLEM — R41.3 MEMORY LOSS: Chronic | Status: ACTIVE | Noted: 2017-02-03

## 2018-11-14 PROBLEM — R79.89 ELEVATED SERUM CREATININE: Chronic | Status: ACTIVE | Noted: 2018-10-29

## 2018-11-14 PROBLEM — I10 ESSENTIAL HYPERTENSION: Chronic | Status: ACTIVE | Noted: 2018-10-15

## 2018-11-14 PROBLEM — E78.00 HYPERCHOLESTEREMIA: Chronic | Status: ACTIVE | Noted: 2018-10-29

## 2018-11-26 ENCOUNTER — OFFICE VISIT (OUTPATIENT)
Dept: INTERNAL MEDICINE CLINIC | Facility: CLINIC | Age: 78
End: 2018-11-26
Payer: MEDICARE

## 2018-11-26 VITALS
HEIGHT: 62 IN | BODY MASS INDEX: 33.45 KG/M2 | RESPIRATION RATE: 16 BRPM | OXYGEN SATURATION: 99 % | SYSTOLIC BLOOD PRESSURE: 130 MMHG | HEART RATE: 72 BPM | DIASTOLIC BLOOD PRESSURE: 82 MMHG | WEIGHT: 181.75 LBS | TEMPERATURE: 98 F

## 2018-11-26 DIAGNOSIS — R79.89 ELEVATED SERUM CREATININE: ICD-10-CM

## 2018-11-26 DIAGNOSIS — E11.8 TYPE 2 DIABETES MELLITUS WITH COMPLICATION, WITHOUT LONG-TERM CURRENT USE OF INSULIN (HCC): ICD-10-CM

## 2018-11-26 DIAGNOSIS — D64.9 ANEMIA, UNSPECIFIED TYPE: ICD-10-CM

## 2018-11-26 DIAGNOSIS — Z00.00 ROUTINE GENERAL MEDICAL EXAMINATION AT A HEALTH CARE FACILITY: Primary | ICD-10-CM

## 2018-11-26 DIAGNOSIS — E78.00 HYPERCHOLESTEREMIA: ICD-10-CM

## 2018-11-26 DIAGNOSIS — M85.80 OSTEOPENIA, UNSPECIFIED LOCATION: ICD-10-CM

## 2018-11-26 PROBLEM — G25.81 RESTLESS LEGS SYNDROME: Chronic | Status: ACTIVE | Noted: 2017-01-25

## 2018-11-26 PROBLEM — R51.9 NONINTRACTABLE HEADACHE: Chronic | Status: ACTIVE | Noted: 2017-01-23

## 2018-11-26 PROCEDURE — 99213 OFFICE O/P EST LOW 20 MIN: CPT | Performed by: INTERNAL MEDICINE

## 2018-11-26 PROCEDURE — G0009 ADMIN PNEUMOCOCCAL VACCINE: HCPCS | Performed by: INTERNAL MEDICINE

## 2018-11-26 PROCEDURE — 90670 PCV13 VACCINE IM: CPT | Performed by: INTERNAL MEDICINE

## 2018-11-26 PROCEDURE — G0439 PPPS, SUBSEQ VISIT: HCPCS | Performed by: INTERNAL MEDICINE

## 2018-11-26 RX ORDER — PRAVASTATIN SODIUM 20 MG
20 TABLET ORAL NIGHTLY
Qty: 90 TABLET | Refills: 3 | Status: SHIPPED | OUTPATIENT
Start: 2018-11-26 | End: 2019-02-24

## 2018-11-26 RX ORDER — CHOLECALCIFEROL (VITAMIN D3) 50 MCG
1 TABLET ORAL DAILY
Qty: 90 TABLET | Refills: 3 | Status: SHIPPED | OUTPATIENT
Start: 2018-11-26 | End: 2019-12-05

## 2018-11-26 RX ORDER — ALENDRONATE SODIUM 70 MG/1
70 TABLET ORAL WEEKLY
Qty: 13 TABLET | Refills: 3 | Status: SHIPPED | OUTPATIENT
Start: 2018-11-26 | End: 2019-02-24

## 2018-11-26 NOTE — PROGRESS NOTES
Bolivar BOWLING 1940 is a 69year old female. Patient presents with:  Physical      HPI:   Poor historian see below    Current Outpatient Medications:  Pravastatin Sodium (PRAVACHOL) 20 MG Oral Tab Take 1 tablet (20 mg total) by mouth nightly. 3   tobramycin-dexamethasone (TOBRADEX) 0.3-0.1 % Ophthalmic Suspension every 4 (four) hours while awake. Disp:  Rfl:    ASPIRIN LOW DOSE 81 MG Oral Chew Tab Chew 1 tablet by mouth daily.  Disp:  Rfl: 9   Metoprolol Succinate ER (TOPROL XL) 50 MG Oral Table blood-tinged sputum,wheezing. Breathing normal pattern . Cardiovascular:   Patient denies chest pain, shortness of breath/rheumatic fever/murmur/dizzziness cholesterol normal. Leg edema none. Orthopnea none. Palpitations none.  PND (paroxsymal nocturnal d symmetrical.   tenderness none. Axilla: unremarkable. Breast Mass: no palpable masses. General: unremarkable. Nipple Abnormality: none. Skin Change: none. LUNGS:   Auscultation: clear .    Chest Shape: normal .   Percussion: normal.   Rales: no location  -     Cholecalciferol (VITAMIN D) 2000 units Oral Tab; Take 1 tablet by mouth daily for 90 doses. -     Calcium 500 MG Oral Chew Tab; Chew 500 mg by mouth 2 (two) times daily. -     alendronate (FOSAMAX) 70 MG Oral Tab;  Take 1 tablet (70 mg tot

## 2018-11-27 ENCOUNTER — LAB ENCOUNTER (OUTPATIENT)
Dept: LAB | Age: 78
End: 2018-11-27
Attending: INTERNAL MEDICINE
Payer: MEDICARE

## 2018-11-27 DIAGNOSIS — D64.9 ANEMIA, UNSPECIFIED TYPE: ICD-10-CM

## 2018-11-27 DIAGNOSIS — R79.89 ELEVATED SERUM CREATININE: ICD-10-CM

## 2018-11-28 PROCEDURE — 82570 ASSAY OF URINE CREATININE: CPT

## 2018-11-29 ENCOUNTER — APPOINTMENT (OUTPATIENT)
Dept: LAB | Age: 78
End: 2018-11-29
Attending: INTERNAL MEDICINE
Payer: MEDICARE

## 2018-11-29 DIAGNOSIS — R79.89 ELEVATED SERUM CREATININE: ICD-10-CM

## 2018-11-29 DIAGNOSIS — D64.9 ANEMIA, UNSPECIFIED TYPE: ICD-10-CM

## 2018-11-29 PROCEDURE — 82728 ASSAY OF FERRITIN: CPT

## 2018-11-29 PROCEDURE — 82607 VITAMIN B-12: CPT

## 2018-11-29 PROCEDURE — 83550 IRON BINDING TEST: CPT

## 2018-11-29 PROCEDURE — 83540 ASSAY OF IRON: CPT

## 2018-11-29 PROCEDURE — 82746 ASSAY OF FOLIC ACID SERUM: CPT

## 2018-11-29 PROCEDURE — 36415 COLL VENOUS BLD VENIPUNCTURE: CPT

## 2018-12-03 ENCOUNTER — TELEPHONE (OUTPATIENT)
Dept: INTERNAL MEDICINE CLINIC | Facility: CLINIC | Age: 78
End: 2018-12-03

## 2018-12-03 DIAGNOSIS — R79.89 ELEVATED SERUM CREATININE: Primary | ICD-10-CM

## 2018-12-04 RX ORDER — TRAMADOL HYDROCHLORIDE 50 MG/1
TABLET ORAL
Qty: 90 TABLET | Refills: 0 | OUTPATIENT
Start: 2018-12-04

## 2018-12-04 NOTE — TELEPHONE ENCOUNTER
LVM for patient to call office -  wants to know what she is taking Tramadol for.  What does she need it for??

## 2018-12-04 NOTE — TELEPHONE ENCOUNTER
Notes recorded by Jared Moura MD on 12/3/2018 at 3:15 PM CST  Reviewed results   I need 24-hour creatinine clearance not 24-hour urine creatinine  Please  put in the new order have the patient go ahead get the test done ,then see me for follow-up    Un

## 2018-12-04 NOTE — TELEPHONE ENCOUNTER
Requesting TraMADol HCl 50 MG Oral Tab  LOV: 11/26/18  RTC: 4 weeks  Has not been prescribed by provider    Future Appointments   Date Time Provider Kallie Rainey   12/5/2018 10:00 AM [address] [place] [address]   12/24/2018  9:15 AM Nguyen Case

## 2018-12-05 ENCOUNTER — HOSPITAL ENCOUNTER (OUTPATIENT)
Dept: ULTRASOUND IMAGING | Age: 78
Discharge: HOME | End: 2018-12-05
Attending: INTERNAL MEDICINE
Payer: MEDICARE

## 2018-12-05 DIAGNOSIS — R79.89 ELEVATED SERUM CREATININE: ICD-10-CM

## 2018-12-05 PROCEDURE — 76775 US EXAM ABDO BACK WALL LIM: CPT | Performed by: INTERNAL MEDICINE

## 2018-12-11 NOTE — TELEPHONE ENCOUNTER
Spoke with pt's daughter and she stated that pt takes the medication for the pain in her legs/feet. Pt is also requesting a refill on gabapentin.

## 2018-12-11 NOTE — TELEPHONE ENCOUNTER
Refill requested:   Requested Prescriptions     Pending Prescriptions Disp Refills   • TraMADol HCl 50 MG Oral Tab 90 tablet 3     Sig: Take 1 tablet (50 mg total) by mouth every 6 (six) hours.    • gabapentin 300 MG Oral Cap  0     Sig: Take 1 capsule (300 (3) no apparent problem

## 2018-12-12 RX ORDER — GABAPENTIN 300 MG/1
300 CAPSULE ORAL 2 TIMES DAILY
Qty: 60 CAPSULE | Refills: 0 | Status: SHIPPED | OUTPATIENT
Start: 2018-12-12 | End: 2019-04-30

## 2018-12-12 RX ORDER — TRAMADOL HYDROCHLORIDE 50 MG/1
50 TABLET ORAL EVERY 6 HOURS
Qty: 90 TABLET | Refills: 0 | Status: SHIPPED | OUTPATIENT
Start: 2018-12-12 | End: 2019-03-04

## 2018-12-12 NOTE — TELEPHONE ENCOUNTER
Refilled x 1 month - tramadol prescription can be faxed in - needs to discuss further/long-term refills with Dr. Katy Cummings at upcoming appointment.

## 2018-12-14 ENCOUNTER — LAB ENCOUNTER (OUTPATIENT)
Dept: LAB | Age: 78
End: 2018-12-14
Attending: INTERNAL MEDICINE
Payer: MEDICARE

## 2018-12-14 DIAGNOSIS — R79.89 ELEVATED SERUM CREATININE: ICD-10-CM

## 2018-12-14 PROCEDURE — 36415 COLL VENOUS BLD VENIPUNCTURE: CPT

## 2018-12-14 PROCEDURE — 82575 CREATININE CLEARANCE TEST: CPT

## 2018-12-14 PROCEDURE — 82565 ASSAY OF CREATININE: CPT

## 2018-12-18 ENCOUNTER — TELEPHONE (OUTPATIENT)
Dept: INTERNAL MEDICINE CLINIC | Facility: CLINIC | Age: 78
End: 2018-12-18

## 2018-12-18 NOTE — TELEPHONE ENCOUNTER
States that 2 medication was given to her and its making her gum cuts and pt can't eat.  She doesn't know the name of the medication and its hard to understand

## 2018-12-18 NOTE — TELEPHONE ENCOUNTER
Pt daughter stated that pt reports her mouth being really sore. She states that it feels like it is cut and little bumps inside mouth. Pt daughter stated that she feels like she cannot chew/eat anything.  Pt daughter wanting to know if pt can have medicatio

## 2018-12-24 ENCOUNTER — OFFICE VISIT (OUTPATIENT)
Dept: INTERNAL MEDICINE CLINIC | Facility: CLINIC | Age: 78
End: 2018-12-24
Payer: MEDICARE

## 2018-12-24 VITALS
TEMPERATURE: 98 F | BODY MASS INDEX: 32.76 KG/M2 | SYSTOLIC BLOOD PRESSURE: 160 MMHG | DIASTOLIC BLOOD PRESSURE: 72 MMHG | HEART RATE: 70 BPM | OXYGEN SATURATION: 94 % | HEIGHT: 62 IN | RESPIRATION RATE: 12 BRPM | WEIGHT: 178 LBS

## 2018-12-24 DIAGNOSIS — I10 ESSENTIAL HYPERTENSION: ICD-10-CM

## 2018-12-24 DIAGNOSIS — N18.30 STAGE 3 CHRONIC KIDNEY DISEASE (HCC): ICD-10-CM

## 2018-12-24 DIAGNOSIS — D64.9 ANEMIA, UNSPECIFIED TYPE: Primary | ICD-10-CM

## 2018-12-24 PROCEDURE — 99213 OFFICE O/P EST LOW 20 MIN: CPT | Performed by: INTERNAL MEDICINE

## 2018-12-24 RX ORDER — METOPROLOL SUCCINATE 50 MG/1
100 TABLET, EXTENDED RELEASE ORAL DAILY
Refills: 0 | COMMUNITY
Start: 2018-12-24 | End: 2018-12-28

## 2018-12-24 NOTE — PROGRESS NOTES
Barbi Wright DOB 1940 is a 69year old female. Patient presents with: Follow - Up       HPI:       Current Outpatient Medications:  Metoprolol Succinate ER 50 MG Oral Tablet 24 Hr Take 2 tablets (100 mg total) by mouth daily.  Disp:  Rfl: 0   Tr 6 (six) hours as needed for Pain. Disp:  Rfl:    TOPIRAMATE 50 MG Oral Tab TAKE 1 TABLET BY MOUTH EVERY NIGHT AT BEDTIME Disp: 30 tablet Rfl: 0   famoTIDine 20 MG Oral Tab Take 20 mg by mouth 2 (two) times daily.  Disp:  Rfl:    tobramycin-dexamethasone (TO wheezing/rhonchi/rales. Breath sounds bilaterally: symmetrical.       ASSESSMENT AND PLAN:   Amanuel Willis was seen today for follow - up. Diagnoses and all orders for this visit:    Anemia, unspecified type  -     OCCULT BLOOD, FECAL, IMMUNOASSAY;  Future

## 2018-12-26 ENCOUNTER — LAB ENCOUNTER (OUTPATIENT)
Dept: LAB | Age: 78
End: 2018-12-26
Attending: INTERNAL MEDICINE
Payer: MEDICARE

## 2018-12-26 DIAGNOSIS — D64.9 ANEMIA, UNSPECIFIED TYPE: ICD-10-CM

## 2018-12-26 PROCEDURE — 82272 OCCULT BLD FECES 1-3 TESTS: CPT

## 2018-12-26 PROCEDURE — 82274 ASSAY TEST FOR BLOOD FECAL: CPT

## 2018-12-27 ENCOUNTER — TELEPHONE (OUTPATIENT)
Dept: INTERNAL MEDICINE CLINIC | Facility: CLINIC | Age: 78
End: 2018-12-27

## 2018-12-27 DIAGNOSIS — R19.5 POSITIVE OCCULT STOOL BLOOD TEST: Primary | ICD-10-CM

## 2018-12-27 DIAGNOSIS — I10 ESSENTIAL HYPERTENSION: ICD-10-CM

## 2018-12-27 NOTE — PROGRESS NOTES
Abnormal  Patient occult blood is positive. She should see GI.   [facility]

## 2018-12-28 RX ORDER — METOPROLOL SUCCINATE 50 MG/1
100 TABLET, EXTENDED RELEASE ORAL DAILY
Qty: 180 TABLET | Refills: 0 | Status: SHIPPED | OUTPATIENT
Start: 2018-12-28 | End: 2019-02-05

## 2018-12-28 NOTE — TELEPHONE ENCOUNTER
Pt daughter notified of results and provider instructions. Pt daughter verbalized understanding and requested to  copy of contact information for [facility] as well as result. Copy printed and placed at  file folder under \"P\".      Pt da

## 2018-12-28 NOTE — TELEPHONE ENCOUNTER
Notes recorded by Laurel Cuevas MD on 12/27/2018 at 9:09 AM CST  Abnormal  Patient occult blood is positive.  She should see GI.  Give her a referral for [facility]    Left message for patient to call office back.   Contact information for [facility] is

## 2019-01-01 ENCOUNTER — EXTERNAL RECORD (OUTPATIENT)
Dept: HEALTH INFORMATION MANAGEMENT | Facility: OTHER | Age: 79
End: 2019-01-01

## 2019-01-05 ENCOUNTER — OFFICE VISIT (OUTPATIENT)
Dept: INTERNAL MEDICINE CLINIC | Facility: CLINIC | Age: 79
End: 2019-01-05

## 2019-01-05 VITALS
DIASTOLIC BLOOD PRESSURE: 80 MMHG | OXYGEN SATURATION: 98 % | TEMPERATURE: 98 F | BODY MASS INDEX: 32.76 KG/M2 | RESPIRATION RATE: 15 BRPM | SYSTOLIC BLOOD PRESSURE: 138 MMHG | HEART RATE: 80 BPM | WEIGHT: 178 LBS | HEIGHT: 62 IN

## 2019-01-05 DIAGNOSIS — R41.0 CONFUSION: Primary | ICD-10-CM

## 2019-01-05 DIAGNOSIS — I10 ESSENTIAL HYPERTENSION: ICD-10-CM

## 2019-01-05 DIAGNOSIS — E11.8 TYPE 2 DIABETES MELLITUS WITH COMPLICATION, WITHOUT LONG-TERM CURRENT USE OF INSULIN (HCC): ICD-10-CM

## 2019-01-05 PROCEDURE — 99214 OFFICE O/P EST MOD 30 MIN: CPT | Performed by: INTERNAL MEDICINE

## 2019-01-05 RX ORDER — GLIPIZIDE 10 MG/1
5 TABLET ORAL
Refills: 0 | COMMUNITY
Start: 2019-01-05 | End: 2019-02-05

## 2019-01-05 NOTE — PROGRESS NOTES
Kaci BOWLING 1940 is a 69year old female. Patient presents with:  Confusion  Long-standing    HPI:      poor historian.   Per daughter apparently she has periods of confusion this may be present in the morning may be sometime during the afte ELBOWS BID Disp:  Rfl: 2   Pioglitazone HCl 30 MG Oral Tab Take 1 tablet by mouth daily. Disp:  Rfl: 12   SITagliptin Phosphate 100 MG Oral Tab Take 100 mg by mouth daily. Disp:  Rfl:    lisinopril 20 MG Oral Tab Take 20 mg by mouth 2 (two) times daily. Cuff Size: adult)   Pulse 80   Temp 97.8 °F (36.6 °C) (Oral)   Resp 15   Ht 62\"   Wt 178 lb   SpO2 98%   BMI 32.56 kg/m²     Neck:   Normal Neck: no masses, adenopathy or thyromegaly found.  Trachea is midline and there is full range of motion in all excur Return in about 4 weeks (around 2/2/2019) for after seeing consults. Maycol Garcia MD

## 2019-01-10 ENCOUNTER — TELEPHONE (OUTPATIENT)
Dept: INTERNAL MEDICINE CLINIC | Facility: CLINIC | Age: 79
End: 2019-01-10

## 2019-01-10 RX ORDER — BLOOD SUGAR DIAGNOSTIC
STRIP MISCELLANEOUS
Qty: 200 EACH | Refills: 1 | Status: SHIPPED | OUTPATIENT
Start: 2019-01-10 | End: 2019-03-21

## 2019-01-10 NOTE — TELEPHONE ENCOUNTER
Patient's Daughter calling in requesting a refill for CONTOUR NEXT TEST In Vitro Strip    To be sent to:  [facility]  [address] [facility], [address] [place] [address] 11, [phone], Tremayne Duque [address]:  [employer]

## 2019-01-10 NOTE — TELEPHONE ENCOUNTER
Dr Restrepo Neighbor office called and we sent patient to them as a referral and patient has is Eligible for coverage and has [address] [facility]  [facility] no longer takes this insurance calling as fyi to let doctor know and to [address]

## 2019-01-11 ENCOUNTER — OFFICE VISIT (OUTPATIENT)
Dept: NEPHROLOGY | Facility: CLINIC | Age: 79
End: 2019-01-11

## 2019-01-11 VITALS — WEIGHT: 176 LBS | DIASTOLIC BLOOD PRESSURE: 82 MMHG | BODY MASS INDEX: 32 KG/M2 | SYSTOLIC BLOOD PRESSURE: 134 MMHG

## 2019-01-11 DIAGNOSIS — R80.9 PROTEINURIA, UNSPECIFIED TYPE: ICD-10-CM

## 2019-01-11 DIAGNOSIS — N18.30 CKD (CHRONIC KIDNEY DISEASE) STAGE 3, GFR 30-59 ML/MIN (HCC): Primary | ICD-10-CM

## 2019-01-11 DIAGNOSIS — I10 ESSENTIAL HYPERTENSION: ICD-10-CM

## 2019-01-11 NOTE — PROGRESS NOTES
Nephrology Consult Note    REASON FOR CONSULT: CKD 3 / proteinuria    ASSESSMENT/PLAN:        1) CKD 3- serum creatinine approximately 1.3 mg/dL is due to a combination of diabetic nephropathy, hypertensive and age-related nephrosclerosis.   This is consist loss/gain  Denies HA or visual changes  Denies CP or palpitations  Denies SOB/cough/hemoptysis  Denies abd or flank pain  Denies N/V/D  Denies change in urinary habits or gross hematuria  Denies LE edema  Denies skin rashes/myalgias/arthralgias    PMH:  Pa Tab Take 1 tablet by mouth daily.  Disp:  Rfl: 6   Timolol Maleate 0.5 % Ophthalmic Solution INT 1 GTT TO OU BID Disp:  Rfl: 10   triamcinolone acetonide 0.1 % External Cream JEANNE TO ELBOWS BID Disp:  Rfl: 2   Pioglitazone HCl 30 MG Oral Tab Take 1 tablet by Family History:  No family history on file.      PHYSICAL EXAM:   /82   Wt 176 lb   BMI 32.19 kg/m²    Wt Readings from Last 3 Encounters:  01/11/19 : 176 lb  01/05/19 : 178 lb  12/24/18 : 178 lb    General: Alert and oriented in no apparent distr

## 2019-02-05 DIAGNOSIS — I10 ESSENTIAL HYPERTENSION: ICD-10-CM

## 2019-02-05 RX ORDER — GLIPIZIDE 10 MG/1
5 TABLET ORAL
Qty: 90 TABLET | Refills: 0 | Status: SHIPPED | OUTPATIENT
Start: 2019-02-05 | End: 2019-02-12 | Stop reason: DRUGHIGH

## 2019-02-05 RX ORDER — ALOGLIPTIN 25 MG/1
1 TABLET, FILM COATED ORAL DAILY
Qty: 90 TABLET | Refills: 1 | Status: SHIPPED | OUTPATIENT
Start: 2019-02-05 | End: 2019-05-01

## 2019-02-05 RX ORDER — METOPROLOL SUCCINATE 50 MG/1
100 TABLET, EXTENDED RELEASE ORAL DAILY
Qty: 180 TABLET | Refills: 0 | Status: SHIPPED | OUTPATIENT
Start: 2019-02-05 | End: 2019-05-01

## 2019-02-05 RX ORDER — LANCETS
EACH MISCELLANEOUS
Qty: 200 EACH | Refills: 3 | Status: SHIPPED | OUTPATIENT
Start: 2019-02-05 | End: 2019-05-02 | Stop reason: ALTCHOICE

## 2019-02-05 RX ORDER — PIOGLITAZONEHYDROCHLORIDE 30 MG/1
30 TABLET ORAL DAILY
Qty: 90 TABLET | Refills: 0 | Status: SHIPPED | OUTPATIENT
Start: 2019-02-05 | End: 2019-02-12

## 2019-02-05 RX ORDER — BLOOD SUGAR DIAGNOSTIC
STRIP MISCELLANEOUS
Qty: 200 EACH | Refills: 1 | OUTPATIENT
Start: 2019-02-05

## 2019-02-05 NOTE — TELEPHONE ENCOUNTER
Patient's Daughter call on behalf of Gautam Mckinney, requesting a refill for the following medications:    TraMADol HCl 50 MG Oral Tab  glipiZIDE 10 MG Oral Tab  Alogliptin Benzoate 25 MG Oral Tab  Metoprolol Succinate ER 50 MG Oral Tablet 24 Hr  Pioglitazone HCl 3

## 2019-02-06 RX ORDER — ISOPROPYL ALCOHOL 0.75 G/1
SWAB TOPICAL
Qty: 200 EACH | Refills: 3 | Status: SHIPPED | OUTPATIENT
Start: 2019-02-06 | End: 2020-06-01

## 2019-02-06 NOTE — TELEPHONE ENCOUNTER
Protocol passed.      Medication(s) to Refill:   Requested Prescriptions     Pending Prescriptions Disp Refills   • Alcohol Swabs (BD SWAB SINGLE USE REGULAR) Does not apply Pads  3   • Alogliptin Benzoate 25 MG Oral Tab 30 tablet 6     Sig: Take 1 tablet b 24 10/15/2018    BILT 0.3 10/15/2018    TP 8.3 (H) 10/15/2018    ALB 3.8 10/15/2018    GLOBULIN 4.5 (H) 10/15/2018     10/15/2018    K 4.6 10/15/2018     10/15/2018    CO2 24.0 10/15/2018     Lab Results   Component Value Date     (H) 10

## 2019-02-06 NOTE — TELEPHONE ENCOUNTER
Pharm called for clarification of meds due to Pioglitazone was cancelled last month and pt was on the emergency room  version of the  GlipiZIDE. The refill is for the reg   GlipiZIDE. Please call to discuss.

## 2019-02-07 RX ORDER — TRAMADOL HYDROCHLORIDE 50 MG/1
50 TABLET ORAL EVERY 6 HOURS
Qty: 90 TABLET | Refills: 0 | OUTPATIENT
Start: 2019-02-07

## 2019-02-07 NOTE — TELEPHONE ENCOUNTER
Left message for patient daughter to call office back.    -Per VM - why does pt need refill on Tramadol? What is pt taking it for?    -Verify what diabetic medication pt is taking:  Sitagliptin/Alogliptin? Glipizide? Pioglitazone?

## 2019-02-08 ENCOUNTER — NURSE ONLY (OUTPATIENT)
Dept: ENDOCRINOLOGY CLINIC | Facility: CLINIC | Age: 79
End: 2019-02-08
Payer: MEDICARE

## 2019-02-08 VITALS
HEIGHT: 62 IN | SYSTOLIC BLOOD PRESSURE: 157 MMHG | HEART RATE: 76 BPM | BODY MASS INDEX: 32.02 KG/M2 | DIASTOLIC BLOOD PRESSURE: 72 MMHG | WEIGHT: 174 LBS

## 2019-02-08 DIAGNOSIS — E11.8 TYPE 2 DIABETES MELLITUS WITH COMPLICATION, WITHOUT LONG-TERM CURRENT USE OF INSULIN (HCC): Primary | Chronic | ICD-10-CM

## 2019-02-08 LAB
CARTRIDGE LOT#: 974 NUMERIC
HEMOGLOBIN A1C: 6.7 % (ref 4.3–5.6)

## 2019-02-08 PROCEDURE — 83036 HEMOGLOBIN GLYCOSYLATED A1C: CPT

## 2019-02-08 PROCEDURE — G0108 DIAB MANAGE TRN  PER INDIV: HCPCS

## 2019-02-08 NOTE — TELEPHONE ENCOUNTER
Pt daughter Gilmar Berkowitz called back. Asked her questions listed below and she stated she was not currently with pt so she is unsure exactly what she is taking. She stated pt told her she needed a refill on all those medications.  Asked for her to verify with pt

## 2019-02-08 NOTE — PROGRESS NOTES
Virginia Barrera  : 1940 attended Step 1 Diabetic Education:    Date: 2019   Start time: 10:30am End time: 11:30am    /72   Pulse 76   Ht 62\"   Wt 174 lb   BMI 31.83 kg/m²     HEMOGLOBIN A1C (%)   Date Value   2019 6.7 (A)        Jv Hill

## 2019-02-11 NOTE — TELEPHONE ENCOUNTER
Please call pharmacy back to discuss all of this patient's RX medications/diabetic supplies; family is confused and they do not have any idea what to fill for this patient

## 2019-02-12 RX ORDER — GLIPIZIDE 5 MG/1
5 TABLET ORAL
Qty: 180 TABLET | Refills: 0 | Status: SHIPPED | OUTPATIENT
Start: 2019-02-12 | End: 2019-03-12 | Stop reason: ALTCHOICE

## 2019-02-12 NOTE — TELEPHONE ENCOUNTER
Spoke with pharmacist Marta Hernández and she stated their records show pt has been on glipizide 10mg ER BID since 5/11/17. Advised pharmacist that per provider LOV note pt was to decrease glipizide to 5 mg daily.  She also stated per their records pt is not taking pi

## 2019-02-13 NOTE — TELEPHONE ENCOUNTER
Spoke with Dr. Bakari Harry - he stated pt to take 5 mg glipizide BID (no ER). Called pharmacy and updated pharmacist per provider response. He verbalized understanding and cancelled old orders. Order sent to pharmacy for 5 mg glipizide BID.

## 2019-02-15 ENCOUNTER — TELEPHONE (OUTPATIENT)
Dept: INTERNAL MEDICINE CLINIC | Facility: CLINIC | Age: 79
End: 2019-02-15

## 2019-02-15 NOTE — TELEPHONE ENCOUNTER
Received fax from Joanie Crabtree would like us to fax over the most recent OV notes from Madhu Knott and provider taking care of pt diabetes and that is her PCP. faxved over notes per yue request ok to fax [phone]. Confirmation sheet was received.

## 2019-02-19 NOTE — TELEPHONE ENCOUNTER
Refill requested:   Requested Prescriptions     Pending Prescriptions Disp Refills   • TRAMADOL HCL 50 MG Oral Tab [Pharmacy Med Name: TRAMADOL 50MG TABLETS] 90 tablet 0     Sig: TAKE 1 TABLET BY MOUTH EVERY 6 HOURS       Failed protocol    Last refill: 12 correction factor ratios will be calculated and, if already in use, will  be reviewed for accuracy and recalculated if necessary.   INDIVIDUAL FOLLOW-UP APPOINTMENTS To be determined at appointment  INITIAL INDIVIDUAL ASSESSMENTS An assessment and meter tra

## 2019-02-21 RX ORDER — TRAMADOL HYDROCHLORIDE 50 MG/1
TABLET ORAL
Qty: 90 TABLET | Refills: 0 | OUTPATIENT
Start: 2019-02-21

## 2019-02-21 NOTE — TELEPHONE ENCOUNTER
Attempted to call # on file. Unable to leave a VM as it states mailbox is full. Declining to pharmacy to have pt contact our office.

## 2019-02-27 NOTE — PROGRESS NOTES
[facility] with [address]  1/18/1940  Primary Care Provider:  Kailash Arzate MD    2/27/2019  Accompanied visit:  () No (x) yes, by:  daughter    67year old yo patient being se Phosphate 100 MG Oral Tab, Take 100 mg by mouth daily. , Disp: , Rfl:   •  lisinopril 20 MG Oral Tab, Take 20 mg by mouth 2 (two) times daily.   , Disp: , Rfl:   •  HYDROcodone-acetaminophen 5-325 MG Oral Tab, Take 1 tablet by mouth every 6 (six) hours as ne appointment  Patient understands that if needed, based on condition and or test results, follow up will be readjusted        Taisha Vang MD  Vascular & General Neurology  Director, Pasha Lubin  2/27/2019,

## 2019-02-27 NOTE — H&P
[facility]   Neurology; INITIAL CLINIC VISIT  2019, 9:40 AM     Maurice Byrd Patient Status:  No patient class for patient encounter    1940 MRN [id number]   Location [facility] Attending No att. provider not apply Misc, TEST BID, Disp: 200 each, Rfl: 3  •  CONTOUR NEXT TEST In Vitro Strip, TEST BID, Disp: 200 each, Rfl: 1  •  TraMADol HCl 50 MG Oral Tab, Take 1 tablet (50 mg total) by mouth every 6 (six) hours. , Disp: 90 tablet, Rfl: 0  •  gabapentin 300 M sensations are symmetric. Palate elevates symmetrically. Tongue protruded midline.   Appendicular strength is full and tone is normal.    Reflexes are normal and symmetric  Sensory examination to light touch, pin and proprioception were intact  Cerebella

## 2019-02-28 ENCOUNTER — TELEPHONE (OUTPATIENT)
Dept: NEUROLOGY | Facility: CLINIC | Age: 79
End: 2019-02-28

## 2019-02-28 RX ORDER — TRAMADOL HYDROCHLORIDE 50 MG/1
50 TABLET ORAL EVERY 6 HOURS
Qty: 90 TABLET | Refills: 0 | Status: CANCELLED | OUTPATIENT
Start: 2019-02-28

## 2019-02-28 NOTE — TELEPHONE ENCOUNTER
Called and spoke with representative concerning questions on MRI. Per insurance protocol, patient will need to have her MRI's completed at a free standing facility.     Informed insurance provider prefers to have testing completed at [facility] abdiaziz/shakila [facility]

## 2019-02-28 NOTE — TELEPHONE ENCOUNTER
Pt Daughter calling in on behalf of Arlette Jeans, requesting a refill for TraMADol HCl 50 MG Oral Tab    To be sent to:  [id number] [facility] [address] [facility], [address] [phone], [phone]

## 2019-02-28 NOTE — TELEPHONE ENCOUNTER
US imaging network called to get specific questions regards to MRI referral.    Stated she already talked to PA and needed to speak with Dr. Andrey Byers nurse to get specific answers.

## 2019-02-28 NOTE — H&P (VIEW-ONLY)
[facility] - History and Physical                                                                                         Patient presents with:  Establish Care  Anemia      HISTORY OF PRESENT ILLNESS:   Josiane Gonzalez is a 67 year o Swabs (BD SWAB SINGLE USE REGULAR) Does not apply Pads U ONCE D UTD Disp: 200 each Rfl: 3   Alogliptin Benzoate 25 MG Oral Tab Take 1 tablet by mouth daily.  Disp: 90 tablet Rfl: 1   Metoprolol Succinate ER 50 MG Oral Tablet 24 Hr Take 2 tablets (100 mg tot for crushing sub-sternal chest pain  GASTROINTESTINAL:  see HPI  GENITOURINARY:  negative for dysuria or gross hematuria  INTEGUMENT/BREAST:  SKIN:  negative for jaundice   ALLERGIC/IMMUNOLOGIC:  negative for hay fever  ENDOCRINE:  negative for cold intole INTERNAL

## 2019-03-01 NOTE — TELEPHONE ENCOUNTER
Requesting TraMADol HCl 50 MG Oral Tab  LOV: 1/5/19  RTC: 4 weeks  Last Relevant Labs: 11/29/18  Filled: 12/12/18 #90 with 0 refills    Future Appointments   Date Time Provider Kallie Rainey   3/4/2019  9:30 AM Eunice Valencia MD [facility] [place]   3/13/2019  9:00 AM Yamilex Gaming RN, CDE [id number] [facility] [place]   3/20/2019  7:00 AM DHOLAKIA, PROCEDURE SGIEDW None   3/20/2019  7:30 AM JUAN, PROCEDURE SGIEDW None   5/29/2019  8:40 AM Maddie Rodrigez PA ENIWARREN [facility] [employer]

## 2019-03-01 NOTE — TELEPHONE ENCOUNTER
MORRISM on Daughter Sal's phone per HIPAA to contact office.  is wanting to know why the patient is taking Tramadol.

## 2019-03-04 ENCOUNTER — OFFICE VISIT (OUTPATIENT)
Dept: INTERNAL MEDICINE CLINIC | Facility: CLINIC | Age: 79
End: 2019-03-04
Payer: MEDICARE

## 2019-03-04 VITALS
RESPIRATION RATE: 14 BRPM | HEIGHT: 62 IN | DIASTOLIC BLOOD PRESSURE: 88 MMHG | HEART RATE: 74 BPM | BODY MASS INDEX: 33.03 KG/M2 | TEMPERATURE: 98 F | WEIGHT: 179.5 LBS | SYSTOLIC BLOOD PRESSURE: 132 MMHG

## 2019-03-04 DIAGNOSIS — N18.4 STAGE 4 CHRONIC KIDNEY DISEASE (HCC): ICD-10-CM

## 2019-03-04 DIAGNOSIS — G62.9 NEUROPATHY: Primary | ICD-10-CM

## 2019-03-04 DIAGNOSIS — E11.8 TYPE 2 DIABETES MELLITUS WITH COMPLICATION, WITHOUT LONG-TERM CURRENT USE OF INSULIN (HCC): ICD-10-CM

## 2019-03-04 DIAGNOSIS — R19.5 OCCULT BLOOD POSITIVE STOOL: ICD-10-CM

## 2019-03-04 PROCEDURE — 99214 OFFICE O/P EST MOD 30 MIN: CPT | Performed by: INTERNAL MEDICINE

## 2019-03-04 RX ORDER — TRAMADOL HYDROCHLORIDE 50 MG/1
50 TABLET ORAL EVERY 8 HOURS PRN
Qty: 60 TABLET | Refills: 0 | Status: SHIPPED | OUTPATIENT
Start: 2019-03-04 | End: 2019-05-01

## 2019-03-04 NOTE — PROGRESS NOTES
Janie BOWLING 1940 is a 67year old female. Patient presents with: Follow - Up: Confusion       HPI:   Patient here because he needs refills on her tramadol.   Apparently has been taking it for her lower extremity discomfort given to her by h Pain. Disp:  Rfl:    TOPIRAMATE 50 MG Oral Tab TAKE 1 TABLET BY MOUTH EVERY NIGHT AT BEDTIME Disp: 30 tablet Rfl: 0   famoTIDine 20 MG Oral Tab Take 20 mg by mouth 2 (two) times daily.  Disp:  Rfl:    tobramycin-dexamethasone (TOBRADEX) 0.3-0.1 % Ophthalmic movement. Auscultation: no wheezing/rhonchi/rales.    Breath sounds bilaterally: symmetrical.   DIABETIC FOOT EXAM BILATERAL  INSPECTION normal  CIRCULATION 1+  MONOFILAMENT sensation in both feet cycle to assess in view of the patient's language barrier

## 2019-03-05 NOTE — TELEPHONE ENCOUNTER
Called [facility] and spoke with nurse reviewer Fredy Marti is approve MRI brain [id number] for [facility] from 3/5/19-4/3/19.     Called patients daughter Adithya Chao (HIPPA OK) unable to leave a message mail box full

## 2019-03-07 ENCOUNTER — HOSPITAL ENCOUNTER (OUTPATIENT)
Dept: ULTRASOUND IMAGING | Facility: HOSPITAL | Age: 79
Discharge: HOME | End: 2019-03-07
Attending: INTERNAL MEDICINE
Payer: MEDICARE

## 2019-03-07 DIAGNOSIS — G62.9 NEUROPATHY: ICD-10-CM

## 2019-03-07 DIAGNOSIS — E11.8 TYPE 2 DIABETES MELLITUS WITH COMPLICATION, WITHOUT LONG-TERM CURRENT USE OF INSULIN (HCC): ICD-10-CM

## 2019-03-07 PROCEDURE — 93923 UPR/LXTR ART STDY 3+ LVLS: CPT | Performed by: INTERNAL MEDICINE

## 2019-03-12 RX ORDER — PRAVASTATIN SODIUM 20 MG
20 TABLET ORAL NIGHTLY
COMMUNITY
End: 2019-04-29

## 2019-03-12 RX ORDER — CALCIUM CARBONATE 200(500)MG
1 TABLET,CHEWABLE ORAL DAILY
COMMUNITY
End: 2019-04-29

## 2019-03-12 RX ORDER — PIOGLITAZONEHYDROCHLORIDE 30 MG/1
30 TABLET ORAL DAILY
COMMUNITY
End: 2019-05-02 | Stop reason: ALTCHOICE

## 2019-03-12 RX ORDER — CHOLECALCIFEROL (VITAMIN D3) 125 MCG
1 CAPSULE ORAL DAILY
COMMUNITY
End: 2020-02-13 | Stop reason: ALTCHOICE

## 2019-03-12 RX ORDER — GLIPIZIDE 5 MG/1
5 TABLET ORAL
COMMUNITY
End: 2019-04-29

## 2019-03-12 RX ORDER — ALENDRONATE SODIUM 70 MG/1
70 TABLET ORAL WEEKLY
COMMUNITY
End: 2019-04-29

## 2019-03-13 ENCOUNTER — NURSE ONLY (OUTPATIENT)
Dept: ENDOCRINOLOGY CLINIC | Facility: CLINIC | Age: 79
End: 2019-03-13
Payer: MEDICARE

## 2019-03-13 ENCOUNTER — ANESTHESIA EVENT (OUTPATIENT)
Dept: ENDOSCOPY | Facility: HOSPITAL | Age: 79
End: 2019-03-13

## 2019-03-13 ENCOUNTER — APPOINTMENT (OUTPATIENT)
Dept: LAB | Age: 79
End: 2019-03-13
Payer: MEDICARE

## 2019-03-13 VITALS
SYSTOLIC BLOOD PRESSURE: 118 MMHG | DIASTOLIC BLOOD PRESSURE: 78 MMHG | BODY MASS INDEX: 31 KG/M2 | HEART RATE: 82 BPM | WEIGHT: 172 LBS

## 2019-03-13 DIAGNOSIS — E11.8 TYPE 2 DIABETES MELLITUS WITH COMPLICATION, WITHOUT LONG-TERM CURRENT USE OF INSULIN (HCC): Chronic | ICD-10-CM

## 2019-03-13 DIAGNOSIS — E11.8 TYPE 2 DIABETES MELLITUS WITH COMPLICATION, WITHOUT LONG-TERM CURRENT USE OF INSULIN (HCC): Primary | ICD-10-CM

## 2019-03-13 DIAGNOSIS — N18.4 STAGE 4 CHRONIC KIDNEY DISEASE (HCC): ICD-10-CM

## 2019-03-13 LAB
ANION GAP SERPL CALC-SCNC: 7 MMOL/L (ref 0–18)
BUN BLD-MCNC: 35 MG/DL (ref 7–18)
BUN/CREAT SERPL: 12.1 (ref 10–20)
CALCIUM BLD-MCNC: 9.4 MG/DL (ref 8.5–10.1)
CHLORIDE SERPL-SCNC: 106 MMOL/L (ref 98–107)
CO2 SERPL-SCNC: 24 MMOL/L (ref 21–32)
CREAT BLD-MCNC: 2.9 MG/DL (ref 0.55–1.02)
GLUCOSE BLD-MCNC: 121 MG/DL (ref 70–99)
OSMOLALITY SERPL CALC.SUM OF ELEC: 293 MOSM/KG (ref 275–295)
POTASSIUM SERPL-SCNC: 4.7 MMOL/L (ref 3.5–5.1)
SODIUM SERPL-SCNC: 137 MMOL/L (ref 136–145)

## 2019-03-13 PROCEDURE — 80048 BASIC METABOLIC PNL TOTAL CA: CPT

## 2019-03-13 PROCEDURE — 36415 COLL VENOUS BLD VENIPUNCTURE: CPT

## 2019-03-13 PROCEDURE — G0109 DIAB MANAGE TRN IND/GROUP: HCPCS

## 2019-03-13 NOTE — PROGRESS NOTES
Josiane Gonzalez  [id number] attended Step 2 Diabetic Education:    Date: 3/13/2019  Start time: 9am End time: 10am      Diabetes Overview, pathophysiology, pre-diabetes, A1C results and treatment options for diabetes self-management, types of diabetes an

## 2019-03-19 ENCOUNTER — TELEPHONE (OUTPATIENT)
Dept: INTERNAL MEDICINE CLINIC | Facility: CLINIC | Age: 79
End: 2019-03-19

## 2019-03-19 RX ORDER — LISINOPRIL 20 MG/1
20 TABLET ORAL 2 TIMES DAILY
Qty: 180 TABLET | Refills: 0 | Status: SHIPPED | OUTPATIENT
Start: 2019-03-19

## 2019-03-19 NOTE — TELEPHONE ENCOUNTER
Protocol failed - Last serum creatinine< 2.0    Medication(s) to Refill:   Requested Prescriptions     Pending Prescriptions Disp Refills   • lisinopril 20 MG Oral Tab 180 tablet 0     Sig: Take 1 tablet (20 mg total) by mouth 2 (two) times daily.        Maryellen Jane

## 2019-03-19 NOTE — TELEPHONE ENCOUNTER
Pt daughter wanting to know if pt should take DM medications the morning of her procedure tomorrow. Please advise if pt to hold DM medications tomorrow morning d/t NPO at midnight tonight.

## 2019-03-19 NOTE — ANESTHESIA PREPROCEDURE EVALUATION
PRE-OP EVALUATION    Patient Name: Bolivar Chang    Pre-op Diagnosis: Occult blood positive stool [R19.5]  Anemia, unspecified type [D64.9]    Procedure(s):  ESOPHAGOGASTRODUODENOSCOPY and Colonoscopy      Surgeon(s) and Role:     * Genevieve Garibay,  Oral Tab Take 20 mg by mouth 2 (two) times daily. Disp:  Rfl:    tobramycin-dexamethasone (TOBRADEX) 0.3-0.1 % Ophthalmic Suspension every 4 (four) hours while awake. Disp:  Rfl:    Travoprost (TRAVATAN) 0.004 % Ophthalmic Solution 1 drop.  Disp:  Rfl: patient meets guidelines. Patient has taken beta blockers in last 24 hours. Comment: Plan IV sedation with GA backup. Risks and benefits of MAC/GA explained including but not limited to aspiration, mouth/dental/airway injury, PONV explained.   Unders

## 2019-03-19 NOTE — TELEPHONE ENCOUNTER
Left detailed message notifying of provider instructions per daughter/pt request. Advised to contact office with questions/concerns.

## 2019-03-19 NOTE — TELEPHONE ENCOUNTER
Pt received a letter from Dr. Wendi Freeman office at [address] [place] [address] on 2/28/19 regarding medication directions. Pt should follow the directions from the surgeon. Medications should be taken with a sip of water. Called daughter back to discuss and no answer.  LVM to

## 2019-03-19 NOTE — TELEPHONE ENCOUNTER
Pt scheduled for c-scope 3/20/19 - pt daughter asking if pt should continue with daily medications.   Daughter couldn't say specifically which meds she is asking about, only the daily meds her mother takes and procedure is scheduled for 3/20/19    Call Gail Victor

## 2019-03-20 ENCOUNTER — ANESTHESIA (OUTPATIENT)
Dept: ENDOSCOPY | Facility: HOSPITAL | Age: 79
End: 2019-03-20

## 2019-03-20 ENCOUNTER — HOSPITAL ENCOUNTER (OUTPATIENT)
Facility: HOSPITAL | Age: 79
Setting detail: HOSPITAL OUTPATIENT SURGERY
Discharge: HOME | End: 2019-03-20
Attending: INTERNAL MEDICINE | Admitting: INTERNAL MEDICINE
Payer: MEDICARE

## 2019-03-20 VITALS
OXYGEN SATURATION: 95 % | WEIGHT: 178 LBS | SYSTOLIC BLOOD PRESSURE: 131 MMHG | RESPIRATION RATE: 18 BRPM | TEMPERATURE: 98 F | DIASTOLIC BLOOD PRESSURE: 98 MMHG | HEIGHT: 62 IN | BODY MASS INDEX: 32.76 KG/M2 | HEART RATE: 69 BPM

## 2019-03-20 DIAGNOSIS — R19.5 OCCULT BLOOD POSITIVE STOOL: ICD-10-CM

## 2019-03-20 DIAGNOSIS — E11.8 TYPE 2 DIABETES MELLITUS WITH COMPLICATION, WITHOUT LONG-TERM CURRENT USE OF INSULIN (HCC): Primary | Chronic | ICD-10-CM

## 2019-03-20 DIAGNOSIS — D64.9 ANEMIA, UNSPECIFIED TYPE: ICD-10-CM

## 2019-03-20 DIAGNOSIS — N18.4 STAGE 4 CHRONIC KIDNEY DISEASE (HCC): ICD-10-CM

## 2019-03-20 LAB — GLUCOSE BLD-MCNC: 148 MG/DL (ref 70–99)

## 2019-03-20 PROCEDURE — 0DJD8ZZ INSPECTION OF LOWER INTESTINAL TRACT, VIA NATURAL OR ARTIFICIAL OPENING ENDOSCOPIC: ICD-10-PCS | Performed by: INTERNAL MEDICINE

## 2019-03-20 PROCEDURE — 82962 GLUCOSE BLOOD TEST: CPT

## 2019-03-20 PROCEDURE — 0DJ08ZZ INSPECTION OF UPPER INTESTINAL TRACT, VIA NATURAL OR ARTIFICIAL OPENING ENDOSCOPIC: ICD-10-PCS | Performed by: INTERNAL MEDICINE

## 2019-03-20 RX ORDER — SODIUM CHLORIDE, SODIUM LACTATE, POTASSIUM CHLORIDE, CALCIUM CHLORIDE 600; 310; 30; 20 MG/100ML; MG/100ML; MG/100ML; MG/100ML
INJECTION, SOLUTION INTRAVENOUS CONTINUOUS
Status: DISCONTINUED | OUTPATIENT
Start: 2019-03-20 | End: 2019-03-20

## 2019-03-20 RX ORDER — DEXTROSE MONOHYDRATE 25 G/50ML
50 INJECTION, SOLUTION INTRAVENOUS
Status: DISCONTINUED | OUTPATIENT
Start: 2019-03-20 | End: 2019-03-20

## 2019-03-20 RX ORDER — NALOXONE HYDROCHLORIDE 0.4 MG/ML
80 INJECTION, SOLUTION INTRAMUSCULAR; INTRAVENOUS; SUBCUTANEOUS AS NEEDED
Status: DISCONTINUED | OUTPATIENT
Start: 2019-03-20 | End: 2019-03-20

## 2019-03-20 NOTE — ANESTHESIA POSTPROCEDURE EVALUATION
[address] Patient Status:  Hospital Outpatient Surgery   Age/Gender 67year old female MRN [id number]   Location [address] [place] [address]. Attending 3 Daphne Riddle DO   Hosp Day # 0 PCP Marcel España MD       Anesthesia Post-op

## 2019-03-20 NOTE — INTERVAL H&P NOTE
Pre-op Diagnosis: Occult blood positive stool [R19.5]  Anemia, unspecified type [D64.9]    The above referenced H&P was reviewed by Melody East DO on 3/20/2019, the patient was examined and no significant changes have occurred in the patient's conditi

## 2019-03-20 NOTE — INTERVAL H&P NOTE
Pre-op Diagnosis: Occult blood positive stool [R19.5]  Anemia, unspecified type [D64.9]    The above referenced H&P was reviewed by Estela Warren DO on 3/20/2019, the patient was examined and no significant changes have occurred in the patient's conditi

## 2019-03-20 NOTE — OPERATIVE REPORT
Home [place] Patient Status:  Hospital Outpatient Surgery    1940 MRN [id number]   [facility] ENDOSCOPY Attending Amelia Saleem DO   Hosp Day # 0 PCP Horace Obrien MD       PREOPERATIVE DIAGNOSIS/INDICATION: Anemia, Occ normal.  Descending colon: The mucosa and vascular pattern were normal.  Sigmoid colon: The mucosa and vascular pattern were normal.  Rectum: The mucosa and vascular pattern were normal. Retroflexion revealed large grade II internal hemorrhoids.      Bala Ludwig

## 2019-03-20 NOTE — OPERATIVE REPORT
Emeli Knee Patient Status:  Hospital Outpatient Surgery    1940 MRN [id number]   [facility] ENDOSCOPY Attending Chandra Ha,    Hosp Day # 0 PCP Ranjit Mendosa MD       PREOPERATIVE DIAGNOSIS/INDICATION: Anemia  P

## 2019-03-21 ENCOUNTER — TELEPHONE (OUTPATIENT)
Dept: INTERNAL MEDICINE CLINIC | Facility: CLINIC | Age: 79
End: 2019-03-21

## 2019-03-21 DIAGNOSIS — E11.8 TYPE 2 DIABETES MELLITUS WITH COMPLICATION, WITHOUT LONG-TERM CURRENT USE OF INSULIN (HCC): Primary | Chronic | ICD-10-CM

## 2019-03-21 NOTE — TELEPHONE ENCOUNTER
[facility] calling in with a new prescription order, due to patients insurance change [facility]).     Requesting:  (Brand) True Metrix -- Meter, Test Strips, Lancets (Testing Twice Daily)    To be sent to:  [address],

## 2019-03-21 NOTE — TELEPHONE ENCOUNTER
PA request sent from [place] for Alogliptin 25 mg    Key: [id number]          [place] [facility]

## 2019-03-22 ENCOUNTER — TELEPHONE (OUTPATIENT)
Dept: INTERNAL MEDICINE CLINIC | Facility: CLINIC | Age: 79
End: 2019-03-22

## 2019-03-22 DIAGNOSIS — E11.8 TYPE 2 DIABETES MELLITUS WITH COMPLICATION, WITHOUT LONG-TERM CURRENT USE OF INSULIN (HCC): Chronic | ICD-10-CM

## 2019-03-22 RX ORDER — CALCIUM CITRATE/VITAMIN D3 200MG-6.25
TABLET ORAL
Qty: 100 STRIP | Refills: 1 | Status: SHIPPED | OUTPATIENT
Start: 2019-03-22

## 2019-03-22 RX ORDER — GLUCOSAM/CHON-MSM1/C/MANG/BOSW 500-416.6
1 TABLET ORAL 2 TIMES DAILY
Qty: 1 BOX | Refills: 1 | Status: SHIPPED | OUTPATIENT
Start: 2019-03-22 | End: 2020-03-21

## 2019-03-22 NOTE — TELEPHONE ENCOUNTER
Protocol passed. Medication(s) to Refill:   Requested Prescriptions     Pending Prescriptions Disp Refills   • Glucose Blood (TRUE METRIX BLOOD GLUCOSE TEST) In Vitro Strip 100 strip 1     Sig: Use 1 strip two times daily to check blood sugar.  E11.8 -

## 2019-03-22 NOTE — TELEPHONE ENCOUNTER
Form for additional information was received from [facility] - it was completed and faxed back @ [phone].     Denial letter was received stating that they would like the patient to try medications that are on [employer]'s preferred drug list. Patient would hav

## 2019-03-23 RX ORDER — GLUCOSAM/CHON-MSM1/C/MANG/BOSW 500-416.6
TABLET ORAL
Refills: 1 | OUTPATIENT
Start: 2019-03-23

## 2019-03-25 NOTE — TELEPHONE ENCOUNTER
Spoke with patients daughter Stephen Basilio and informed her that the medication was denied due to the patient needing to try formulary medications first.   The daughter was confused as to why it was denied and I explained that every insurance has its rules as we

## 2019-03-27 NOTE — TELEPHONE ENCOUNTER
Pt daughter called back. She stated her sister called pt's insurance regarding medication and they stated they needed a form from 's office sent in. Advised pt daughter PA was sent in on 3/22 by MA.  Also advised MA was not here today but would f/u with h

## 2019-04-12 ENCOUNTER — OFFICE VISIT (OUTPATIENT)
Dept: ELECTROPHYSIOLOGY | Facility: HOSPITAL | Age: 79
End: 2019-04-12
Attending: INTERNAL MEDICINE
Payer: MEDICARE

## 2019-04-12 DIAGNOSIS — E11.8 TYPE 2 DIABETES MELLITUS WITH COMPLICATION, WITHOUT LONG-TERM CURRENT USE OF INSULIN (HCC): ICD-10-CM

## 2019-04-12 DIAGNOSIS — G62.9 NEUROPATHY: ICD-10-CM

## 2019-04-12 PROCEDURE — 95886 MUSC TEST DONE W/N TEST COMP: CPT | Performed by: OTHER

## 2019-04-12 PROCEDURE — 95909 NRV CNDJ TST 5-6 STUDIES: CPT | Performed by: OTHER

## 2019-04-12 NOTE — PROCEDURES
[address] [place] [address]  [facility]  [phone]            Patient: Nisreen Mckeon Sex: Female  Patient ID: [id number] YOB: 1940      Visit Date: 4/12/2019 08:39  Patient Age On Visit Jared R. Vastus medialis Femoral L2-L4 N None None None None N N N N   L. Tibialis anterior Deep peroneal (Fibular) L4-L5 N None None None None N N N N   L. Gastrocnemius (Medial head) Tibial S1-S2 N None None None None N N N N   L.  Vastus medialis Femoral L2-L4

## 2019-04-22 ENCOUNTER — TELEPHONE (OUTPATIENT)
Dept: INTERNAL MEDICINE CLINIC | Facility: CLINIC | Age: 79
End: 2019-04-22

## 2019-04-22 NOTE — TELEPHONE ENCOUNTER
Pt daughter made aware of provider message. She verbalized understanding. Pt already has appointment scheduled for this Thursday with Dr. Briseida Melara as well as end of May with neuro.

## 2019-04-22 NOTE — TELEPHONE ENCOUNTER
Has a mildly abnormal EMG nerve conduction of the lower extremity.   Would recommend her to see Marcelo Carcamo her neurologist or could see me for a follow-up

## 2019-04-25 ENCOUNTER — OFFICE VISIT (OUTPATIENT)
Dept: INTERNAL MEDICINE CLINIC | Facility: CLINIC | Age: 79
End: 2019-04-25
Payer: MEDICARE

## 2019-04-25 VITALS
OXYGEN SATURATION: 95 % | TEMPERATURE: 98 F | SYSTOLIC BLOOD PRESSURE: 136 MMHG | HEIGHT: 62 IN | DIASTOLIC BLOOD PRESSURE: 82 MMHG | RESPIRATION RATE: 16 BRPM | WEIGHT: 173.75 LBS | BODY MASS INDEX: 31.97 KG/M2 | HEART RATE: 76 BPM

## 2019-04-25 DIAGNOSIS — G62.9 NEUROPATHY: ICD-10-CM

## 2019-04-25 DIAGNOSIS — D64.9 ANEMIA, UNSPECIFIED TYPE: Primary | ICD-10-CM

## 2019-04-25 PROBLEM — N18.4 STAGE 4 CHRONIC KIDNEY DISEASE (HCC): Chronic | Status: ACTIVE | Noted: 2019-03-04

## 2019-04-25 PROBLEM — R19.5 OCCULT BLOOD POSITIVE STOOL: Status: RESOLVED | Noted: 2019-03-04 | Resolved: 2019-04-25

## 2019-04-25 PROCEDURE — 99213 OFFICE O/P EST LOW 20 MIN: CPT | Performed by: INTERNAL MEDICINE

## 2019-04-25 NOTE — PROGRESS NOTES
Edwige Mercedes  1940 is a 67year old female. Patient presents with:  Test Results       HPI:   Patient here because he needs refills on her tramadol.   Apparently has been taking it for her lower extremity discomfort given to her by her previou Metoprolol Succinate ER 50 MG Oral Tablet 24 Hr Take 2 tablets (100 mg total) by mouth daily.  Disp: 180 tablet Rfl: 0   MICROLET LANCETS Does not apply Misc TEST BID Disp: 200 each Rfl: 3   gabapentin 300 MG Oral Cap Take 1 capsule (300 mg total) by mout exertion none. Fainting none. Fatigue no. High blood pressure on medication(s). Irregular heart beat no. Leg edema no. Murmurs no. Orthopnea no.       EXAM:   /82   Pulse 76   Temp 98 °F (36.7 °C) (Oral)   Resp 16   Ht 62\"   Wt 173 lb 12 oz   SpO2 95

## 2019-04-29 DIAGNOSIS — I10 ESSENTIAL HYPERTENSION: ICD-10-CM

## 2019-04-29 NOTE — TELEPHONE ENCOUNTER
Patient's daughter came in to the office today with all of patient's medications.  States that she is unsure which medications she should be taking, but believes she is due for refill of the following:     Calcium Carbonate Antacid 500 MG Oral Chew Tab  Tevin

## 2019-04-30 NOTE — TELEPHONE ENCOUNTER
Refill requested:   Requested Prescriptions     Pending Prescriptions Disp Refills   • Alogliptin Benzoate 25 MG Oral Tab 90 tablet 1     Sig: Take 1 tablet by mouth daily.    • Calcium Carbonate Antacid 500 MG Oral Chew Tab 90 tablet 0     Sig: Chew 1 tabl This is a one-on-one appointment with a certified diabetes educator.  An assessment will be performed and will identify the pump candidate's ability to:  Perform label reading Identify portion sizes  Use insulin to carbohydrate ratio/correction factor accur May 13, 2019  8:30 AM CDT MRI LUMBAR  with IBIS MR RM1 (1.2T OPEN) [facility] [place] ([facility] [place])    IF YOU REQUIRE ORAL SEDATION FOR YOUR MRI: Your physician is responsible for giving you a prescription for oral medication which you would alexandra

## 2019-05-01 ENCOUNTER — NURSE ONLY (OUTPATIENT)
Dept: ENDOCRINOLOGY CLINIC | Facility: CLINIC | Age: 79
End: 2019-05-01
Payer: MEDICARE

## 2019-05-01 ENCOUNTER — TELEPHONE (OUTPATIENT)
Dept: INTERNAL MEDICINE CLINIC | Facility: CLINIC | Age: 79
End: 2019-05-01

## 2019-05-01 VITALS — HEIGHT: 62 IN | BODY MASS INDEX: 31.83 KG/M2 | WEIGHT: 173 LBS

## 2019-05-01 DIAGNOSIS — E11.8 TYPE 2 DIABETES MELLITUS WITH COMPLICATION, WITHOUT LONG-TERM CURRENT USE OF INSULIN (HCC): Primary | ICD-10-CM

## 2019-05-01 DIAGNOSIS — E11.8 TYPE 2 DIABETES MELLITUS WITH COMPLICATION, UNSPECIFIED WHETHER LONG TERM INSULIN USE: Primary | Chronic | ICD-10-CM

## 2019-05-01 PROCEDURE — G0108 DIAB MANAGE TRN  PER INDIV: HCPCS

## 2019-05-01 RX ORDER — PRAVASTATIN SODIUM 20 MG
20 TABLET ORAL NIGHTLY
Qty: 90 TABLET | Refills: 1 | Status: SHIPPED | OUTPATIENT
Start: 2019-05-01

## 2019-05-01 RX ORDER — ALENDRONATE SODIUM 70 MG/1
70 TABLET ORAL WEEKLY
Qty: 45 TABLET | Refills: 0 | Status: SHIPPED | OUTPATIENT
Start: 2019-05-01 | End: 2019-05-02

## 2019-05-01 RX ORDER — GLIPIZIDE 5 MG/1
5 TABLET ORAL
Qty: 90 TABLET | Refills: 0 | Status: SHIPPED | OUTPATIENT
Start: 2019-05-01 | End: 2019-06-25

## 2019-05-01 RX ORDER — GABAPENTIN 300 MG/1
300 CAPSULE ORAL 2 TIMES DAILY
Qty: 60 CAPSULE | Refills: 0 | Status: SHIPPED | OUTPATIENT
Start: 2019-05-01 | End: 2019-05-29 | Stop reason: DRUGHIGH

## 2019-05-01 RX ORDER — TRAMADOL HYDROCHLORIDE 50 MG/1
50 TABLET ORAL EVERY 8 HOURS PRN
Qty: 60 TABLET | Refills: 0 | OUTPATIENT
Start: 2019-05-01 | End: 2019-06-06

## 2019-05-01 RX ORDER — METOPROLOL SUCCINATE 50 MG/1
100 TABLET, EXTENDED RELEASE ORAL DAILY
Qty: 180 TABLET | Refills: 0 | Status: SHIPPED | OUTPATIENT
Start: 2019-05-01

## 2019-05-01 RX ORDER — CALCIUM CARBONATE 200(500)MG
1 TABLET,CHEWABLE ORAL DAILY
Qty: 90 TABLET | Refills: 0 | Status: SHIPPED | OUTPATIENT
Start: 2019-05-01

## 2019-05-01 RX ORDER — ALOGLIPTIN 25 MG/1
1 TABLET, FILM COATED ORAL DAILY
Qty: 90 TABLET | Refills: 1 | Status: SHIPPED | OUTPATIENT
Start: 2019-05-01 | End: 2019-05-02

## 2019-05-01 NOTE — PROGRESS NOTES
Home Neighbor  : 1940 was seen for Diabetic Education Follow up:    Date: 2019  Referring Provider: Stephen Stephens  Start time: 11am End time: 11:30am    Assessment:     Assessment: Ht 62\"   Wt 173 lb   BMI 31.64 kg/m²      HEMOGLOBIN A1C (%)   Jared complications reviewed including eye, dental, CV health, renal protection, and foot care discussed. Health Coping  Family involvement/support encouraged  Depression and diabetes reviewed. Recommendations:      1. Follow recommended meal plan.    2. Te

## 2019-05-01 NOTE — TELEPHONE ENCOUNTER
Reached out to pts insurance - alteratives for Alogliptin are:  Januvia 25mg, 50mg and 100mg   Tradjenta 5mg  Both with qty 30 for 30 and 0 copay per [address]    Please Advise

## 2019-05-01 NOTE — TELEPHONE ENCOUNTER
Per pharmacy alogliptin is now non-formulary per pt's insurance and is ~$520. Pharmacist is requesting an alternative medication for pt to take. Gadiel Raya - pharmacist - stated alendronate comes in boxes of 4. Gave pt a 90 day supply (1 box with 2 refills).

## 2019-05-01 NOTE — TELEPHONE ENCOUNTER
Vicki Cam from [employer] calling in, requesting to speak with the Nurse regarding the MRI-Lumbar Spine w/o Contrast.  Tracking #: [id number]    T: [phone] option 1 (for Medicare)

## 2019-05-01 NOTE — TELEPHONE ENCOUNTER
Pharmacy would like a call back needs clarification on   alendronate 70 MG   Alogliptin Benzoate 25 MG

## 2019-05-02 ENCOUNTER — TELEPHONE (OUTPATIENT)
Dept: INTERNAL MEDICINE CLINIC | Facility: CLINIC | Age: 79
End: 2019-05-02

## 2019-05-02 RX ORDER — ALENDRONATE SODIUM 70 MG/1
70 TABLET ORAL WEEKLY
Qty: 4 TABLET | Refills: 2 | COMMUNITY
Start: 2019-05-02

## 2019-05-02 NOTE — TELEPHONE ENCOUNTER
Medication sent to pharmacy. Attempted to call daughter Gilmar Berkowitz but no answer and mailbox full.

## 2019-05-02 NOTE — TELEPHONE ENCOUNTER
Patients daughter Logan Sharma called stating that the pt is currently taking 5 mg of Glipizide but that it doesn't work and is requesting 10mg. After speaking with  he stated that he would like a recording of her sugar numbers before he adjusts her dose.

## 2019-05-13 ENCOUNTER — HOSPITAL ENCOUNTER (OUTPATIENT)
Dept: MRI IMAGING | Age: 79
Discharge: HOME | End: 2019-05-13
Attending: INTERNAL MEDICINE
Payer: MEDICARE

## 2019-05-13 DIAGNOSIS — G62.9 NEUROPATHY: ICD-10-CM

## 2019-05-13 PROCEDURE — 72148 MRI LUMBAR SPINE W/O DYE: CPT | Performed by: INTERNAL MEDICINE

## 2019-05-15 ENCOUNTER — OFFICE VISIT (OUTPATIENT)
Dept: HEMATOLOGY/ONCOLOGY | Facility: HOSPITAL | Age: 79
End: 2019-05-15
Attending: INTERNAL MEDICINE
Payer: MEDICARE

## 2019-05-15 VITALS
TEMPERATURE: 97 F | HEART RATE: 69 BPM | BODY MASS INDEX: 32.39 KG/M2 | DIASTOLIC BLOOD PRESSURE: 80 MMHG | HEIGHT: 62 IN | SYSTOLIC BLOOD PRESSURE: 154 MMHG | RESPIRATION RATE: 18 BRPM | OXYGEN SATURATION: 93 % | WEIGHT: 176 LBS

## 2019-05-15 DIAGNOSIS — R63.4 WEIGHT LOSS: Primary | ICD-10-CM

## 2019-05-15 DIAGNOSIS — R14.0 ABDOMINAL DISTENTION: ICD-10-CM

## 2019-05-15 DIAGNOSIS — R06.00 DYSPNEA, UNSPECIFIED TYPE: ICD-10-CM

## 2019-05-15 DIAGNOSIS — D64.9 ANEMIA, UNSPECIFIED TYPE: ICD-10-CM

## 2019-05-15 PROCEDURE — 99203 OFFICE O/P NEW LOW 30 MIN: CPT | Performed by: INTERNAL MEDICINE

## 2019-05-15 NOTE — CONSULTS
[facility]cer Center Report of Consultation    Patient Name: Josiane Gonzalez   YOB: 1940   Medical Record Number: [id number]   CSN: [id number]   Consulting Physician: Efrain Menjivar MD  Referring Physician(s): Roberto Carlos Pablo  Date of Consultation: 5 history. Psychosocial History:  Social History    Socioeconomic History      Marital status:        Spouse name: Not on file      Number of children: Not on file      Years of education: Not on file      Highest education level: Not on file    Occ Narrative      Not on file      Allergies:   No Known Allergies    Current Medications:    Current Outpatient Medications:   •  alendronate 70 MG Oral Tab, Take 1 tablet (70 mg total) by mouth once a week., Disp: 4 tablet, Rfl: 2  •  SITagliptin Phosphate apply Pads, U ONCE D UTD, Disp: 200 each, Rfl: 3  •  Timolol Maleate 0.5 % Ophthalmic Solution, INT 1 GTT TO OU BID, Disp: , Rfl: 10  •  triamcinolone acetonide 0.1 % External Cream, JEANNE TO ELBOWS BID, Disp: , Rfl: 2  •  HYDROcodone-acetaminophen 5-325 MG Patient is alert and oriented x 3, not in acute distress. Accompanied buy daughter Sarah Whyte. HEENT: EOMs intact. PERRL. Oropharynx is clear. Neck: No JVD. No palpable lymphadenopathy. Neck is supple. Lymphatics:  There is no palpable lymphadenopathy  in the

## 2019-05-15 NOTE — PROGRESS NOTES
MD consult for anemia- referred by PCP. Last labs were drawn in Oct 2018. Colonoscopy done 3/2019. Daughter states pt has not tried oral iron. She does not eat much red meat. Reports mild hemorrhoidal bleeding.      Education Record    Learner:  Patientjacinto

## 2019-05-21 ENCOUNTER — TELEPHONE (OUTPATIENT)
Dept: SURGERY | Facility: CLINIC | Age: 79
End: 2019-05-21

## 2019-05-21 NOTE — TELEPHONE ENCOUNTER
Patient was called and LVM to call back. Patient is to have a chest x ray and abdominal U/S prior to having CT scan.  Patient insurance will not pay until we do these images first.

## 2019-05-22 ENCOUNTER — TELEPHONE (OUTPATIENT)
Dept: HEMATOLOGY/ONCOLOGY | Facility: HOSPITAL | Age: 79
End: 2019-05-22

## 2019-05-29 ENCOUNTER — OFFICE VISIT (OUTPATIENT)
Dept: HEMATOLOGY/ONCOLOGY | Facility: HOSPITAL | Age: 79
End: 2019-05-29
Attending: INTERNAL MEDICINE
Payer: MEDICARE

## 2019-05-29 ENCOUNTER — OFFICE VISIT (OUTPATIENT)
Dept: NEUROLOGY | Facility: CLINIC | Age: 79
End: 2019-05-29
Payer: MEDICARE

## 2019-05-29 ENCOUNTER — TELEPHONE (OUTPATIENT)
Dept: NEUROLOGY | Facility: CLINIC | Age: 79
End: 2019-05-29

## 2019-05-29 VITALS
DIASTOLIC BLOOD PRESSURE: 78 MMHG | WEIGHT: 180 LBS | BODY MASS INDEX: 33.13 KG/M2 | HEIGHT: 62 IN | RESPIRATION RATE: 16 BRPM | HEART RATE: 88 BPM | SYSTOLIC BLOOD PRESSURE: 140 MMHG

## 2019-05-29 DIAGNOSIS — D64.9 ANEMIA, UNSPECIFIED TYPE: ICD-10-CM

## 2019-05-29 DIAGNOSIS — G62.9 NEUROPATHY: Chronic | ICD-10-CM

## 2019-05-29 DIAGNOSIS — F01.50 VASCULAR DEMENTIA WITHOUT BEHAVIORAL DISTURBANCE (HCC): ICD-10-CM

## 2019-05-29 DIAGNOSIS — D89.2 PARAPROTEINEMIA: Primary | ICD-10-CM

## 2019-05-29 DIAGNOSIS — I67.9 SMALL VESSEL DISEASE, CEREBROVASCULAR: Primary | ICD-10-CM

## 2019-05-29 PROCEDURE — 99214 OFFICE O/P EST MOD 30 MIN: CPT | Performed by: PHYSICIAN ASSISTANT

## 2019-05-29 RX ORDER — DONEPEZIL HYDROCHLORIDE 10 MG/1
10 TABLET, FILM COATED ORAL NIGHTLY
Qty: 90 TABLET | Refills: 1 | Status: SHIPPED | OUTPATIENT
Start: 2019-05-29 | End: 2020-03-02

## 2019-05-29 RX ORDER — GABAPENTIN 300 MG/1
CAPSULE ORAL
Qty: 90 CAPSULE | Refills: 2 | Status: ON HOLD | OUTPATIENT
Start: 2019-05-29 | End: 2019-08-22

## 2019-05-29 NOTE — PROGRESS NOTES
[facility] with [address]  1/18/1940  Primary Care Provider:  Odalys Landaverde MD    5/29/2019  Accompanied visit: Yes- daughter      67year old female patient being seen for: Me Review of Systems:  Denies systemic symptoms     No CP or SOB. No GI or  symptoms. Relevant Neuro as noted above.       Medications:      Current Outpatient Medications:   •  Donepezil HCl (ARICEPT) 10 MG Oral Tab, Take 1 tablet (10 mg total) by mouth n SINGLE USE REGULAR) Does not apply Pads, U ONCE D UTD, Disp: 200 each, Rfl: 3  •  Timolol Maleate 0.5 % Ophthalmic Solution, INT 1 GTT TO OU BID, Disp: , Rfl: 10  •  triamcinolone acetonide 0.1 % External Cream, JEANNE TO ELBOWS BID, Disp: , Rfl: 2  •  HYDROc effects of any treatment relevant to above. Includes explanation of tests as necessary. Return in about 6 months (around 11/29/2019).       Patient understands that if needed, based on condition and or test results, follow up will be readjusted        D

## 2019-06-01 ENCOUNTER — HOSPITAL ENCOUNTER (OUTPATIENT)
Dept: ULTRASOUND IMAGING | Age: 79
Discharge: HOME | End: 2019-06-01
Attending: NURSE PRACTITIONER
Payer: MEDICARE

## 2019-06-01 ENCOUNTER — HOSPITAL ENCOUNTER (OUTPATIENT)
Dept: GENERAL RADIOLOGY | Age: 79
Discharge: HOME | End: 2019-06-01
Attending: NURSE PRACTITIONER
Payer: MEDICARE

## 2019-06-01 DIAGNOSIS — R63.4 WEIGHT LOSS: ICD-10-CM

## 2019-06-01 DIAGNOSIS — R06.00 DYSPNEA, UNSPECIFIED TYPE: ICD-10-CM

## 2019-06-01 DIAGNOSIS — R14.0 ABDOMINAL DISTENTION: ICD-10-CM

## 2019-06-01 DIAGNOSIS — D64.9 ANEMIA, UNSPECIFIED TYPE: ICD-10-CM

## 2019-06-01 PROCEDURE — 71046 X-RAY EXAM CHEST 2 VIEWS: CPT | Performed by: NURSE PRACTITIONER

## 2019-06-01 PROCEDURE — 76700 US EXAM ABDOM COMPLETE: CPT | Performed by: NURSE PRACTITIONER

## 2019-06-06 RX ORDER — TRAMADOL HYDROCHLORIDE 50 MG/1
TABLET ORAL
Qty: 60 TABLET | Refills: 0 | Status: SHIPPED | OUTPATIENT
Start: 2019-06-06 | End: 2019-06-25

## 2019-06-06 NOTE — TELEPHONE ENCOUNTER
Patient's daughter calling for medication refill status; informed it is pending with Dr Toña Gonzalez.

## 2019-06-06 NOTE — TELEPHONE ENCOUNTER
Refill requested:   Requested Prescriptions     Pending Prescriptions Disp Refills   • TRAMADOL HCL 50 MG Oral Tab [Pharmacy Med Name: TRAMADOL 50MG TABLETS] 60 tablet 0     Sig: TAKE 1 TABLET BY MOUTH EVERY 8 HOURS AS NEEDED FOR PAIN     Non protocol medi any other hair pieces often prevent proper placement of leads; these items will need to be removed prior to the appointment. You may want to bring a brush, comb or a hat for after the appointment.     We do not perform EEGs with sedation as it alters the [address], [address] [facility], [place], [address]    WHO TO CONTACT TO CHANGE APPOINTMENT   If you need to make changes to your appointment or have questions,  please call the Diabetes Center at [phone].     TOPICS DISCUSSED IN CLASS/APPOINTMENT from your doctor, any pertinent lab results and a list of  medications patient is taking. Should you have questions about  insurance coverage, call the [phone] on the back of your  insurance card.       Jul 30, 2019  9:30 AM CDT Exam - Established Lauren

## 2019-06-10 ENCOUNTER — HOSPITAL ENCOUNTER (OUTPATIENT)
Dept: MRI IMAGING | Facility: HOSPITAL | Age: 79
Discharge: HOME | End: 2019-06-10
Attending: Other
Payer: MEDICARE

## 2019-06-10 ENCOUNTER — APPOINTMENT (OUTPATIENT)
Dept: CT IMAGING | Facility: HOSPITAL | Age: 79
End: 2019-06-10
Attending: EMERGENCY MEDICINE
Payer: MEDICARE

## 2019-06-10 ENCOUNTER — HOSPITAL ENCOUNTER (EMERGENCY)
Facility: HOSPITAL | Age: 79
Discharge: HOME | End: 2019-06-10
Attending: EMERGENCY MEDICINE
Payer: MEDICARE

## 2019-06-10 VITALS
DIASTOLIC BLOOD PRESSURE: 69 MMHG | OXYGEN SATURATION: 95 % | BODY MASS INDEX: 32.39 KG/M2 | RESPIRATION RATE: 17 BRPM | HEIGHT: 62 IN | HEART RATE: 72 BPM | TEMPERATURE: 98 F | WEIGHT: 176 LBS | SYSTOLIC BLOOD PRESSURE: 150 MMHG

## 2019-06-10 DIAGNOSIS — M54.31 SCIATICA OF RIGHT SIDE: Primary | ICD-10-CM

## 2019-06-10 DIAGNOSIS — I67.9 SMALL VESSEL DISEASE, CEREBROVASCULAR: ICD-10-CM

## 2019-06-10 PROCEDURE — 99285 EMERGENCY DEPT VISIT HI MDM: CPT | Performed by: EMERGENCY MEDICINE

## 2019-06-10 PROCEDURE — 96360 HYDRATION IV INFUSION INIT: CPT | Performed by: EMERGENCY MEDICINE

## 2019-06-10 PROCEDURE — 74177 CT ABD & PELVIS W/CONTRAST: CPT | Performed by: EMERGENCY MEDICINE

## 2019-06-10 PROCEDURE — 85025 COMPLETE CBC W/AUTO DIFF WBC: CPT | Performed by: EMERGENCY MEDICINE

## 2019-06-10 PROCEDURE — 80053 COMPREHEN METABOLIC PANEL: CPT | Performed by: EMERGENCY MEDICINE

## 2019-06-10 PROCEDURE — 70551 MRI BRAIN STEM W/O DYE: CPT | Performed by: OTHER

## 2019-06-10 PROCEDURE — 96361 HYDRATE IV INFUSION ADD-ON: CPT | Performed by: EMERGENCY MEDICINE

## 2019-06-10 PROCEDURE — 81003 URINALYSIS AUTO W/O SCOPE: CPT | Performed by: EMERGENCY MEDICINE

## 2019-06-10 RX ORDER — TRAMADOL HYDROCHLORIDE 50 MG/1
TABLET ORAL EVERY 6 HOURS PRN
Qty: 10 TABLET | Refills: 0 | Status: SHIPPED | OUTPATIENT
Start: 2019-06-10 | End: 2019-06-17

## 2019-06-10 RX ORDER — GABAPENTIN 100 MG/1
100 CAPSULE ORAL 3 TIMES DAILY
Qty: 30 CAPSULE | Refills: 0 | Status: ON HOLD | OUTPATIENT
Start: 2019-06-10 | End: 2019-08-22

## 2019-06-10 RX ORDER — POLYETHYLENE GLYCOL 3350 17 G/17G
17 POWDER, FOR SOLUTION ORAL DAILY PRN
Qty: 12 EACH | Refills: 0 | Status: SHIPPED | OUTPATIENT
Start: 2019-06-10 | End: 2019-07-10

## 2019-06-10 NOTE — ED INITIAL ASSESSMENT (HPI)
Patient to ED for vaginal and rectal pain x 5 days. Patient denies bleeding or discharge. Patient states has been having constipation for several months. States has bowel movements, but that they are \"little\".  Patient also c/o right sided low back pain t

## 2019-06-10 NOTE — ED PROVIDER NOTES
Patient Seen in: [facility] Emergency Department    History   Patient presents with:   Other    Stated Complaint: Just finished with an outpt test, wants to see an ER MD because she doesn't fee*    HPI    71-year-old patient presents to the emergency cataract bilateral   • OTHER      Left temporal biopsy           Social History    Tobacco Use      Smoking status: Never Smoker      Smokeless tobacco: Never Used    Alcohol use: No      Alcohol/week: 0.0 oz    Drug use: No      Review of Systems    Posit following orders were created for panel order CBC WITH DIFFERENTIAL WITH PLATELET.   Procedure                               Abnormality         Status                     ---------                               -----------         ------ enlargement. AORTA/VASCULAR:  No aneurysm or dissection. Moderate atherosclerotic     plaque of the aorta iliac vessels. RETROPERITONEUM:  No mass or adenopathy. BOWEL/MESENTERY:  No visible mass, obstruction, or bowel wall thickening.       Richardson Goodpasture taking these medications    !! traMADol HCl 50 MG Oral Tab  Take 1-2 tablets ( mg total) by mouth every 6 (six) hours as needed for Pain., Print Script, Disp-10 tablet, R-0    PEG 3350 Oral Powd Pack  Take 17 g by mouth daily as needed. , Print Script

## 2019-06-11 ENCOUNTER — OFFICE VISIT (OUTPATIENT)
Dept: HEMATOLOGY/ONCOLOGY | Facility: HOSPITAL | Age: 79
End: 2019-06-11
Attending: INTERNAL MEDICINE
Payer: MEDICARE

## 2019-06-11 VITALS
DIASTOLIC BLOOD PRESSURE: 75 MMHG | HEART RATE: 70 BPM | WEIGHT: 176.63 LBS | SYSTOLIC BLOOD PRESSURE: 129 MMHG | BODY MASS INDEX: 32.5 KG/M2 | TEMPERATURE: 96 F | OXYGEN SATURATION: 97 % | HEIGHT: 62 IN | RESPIRATION RATE: 16 BRPM

## 2019-06-11 DIAGNOSIS — D89.2 PARAPROTEINEMIA: ICD-10-CM

## 2019-06-11 DIAGNOSIS — D64.9 ANEMIA, UNSPECIFIED TYPE: Primary | ICD-10-CM

## 2019-06-11 PROCEDURE — 99214 OFFICE O/P EST MOD 30 MIN: CPT | Performed by: INTERNAL MEDICINE

## 2019-06-11 NOTE — PROGRESS NOTES
Pt here for MD follow up. Pt was seen in the ED yesterday for constipation/pain. XR and US done 6/1.     Education Record    Learner:  Patient and Family Member    Disease / Diagnosis:    Barriers / Limitations:  None   Comments:    Method:  Discussion   Co

## 2019-06-11 NOTE — PROGRESS NOTES
[facility] Progress Note      Patient Name:  Eleazar Crowell  YOB: 1940  Medical Record Number:  [id number]    Date of visit:  6/11/2019    CHIEF COMPLAINT: Anemia    HPI:     67year old that I saw last month for work-up of anemia Carbonate Antacid 500 MG Oral Chew Tab Chew 1 tablet (500 mg total) by mouth daily.  Disp: 90 tablet Rfl: 0   glipiZIDE 5 MG Oral Tab Take 1 tablet (5 mg total) by mouth every morning before breakfast. Disp: 90 tablet Rfl: 0   Metoprolol Succinate ER 50 MG )  BP: 129/75 (1122)  Temp: 96.3 °F (35.7 °C) (1122)  Do Not Use - Resp Rate: --  SpO2: 97 % (1122)    PS:  ECO    PHYSICAL EXAM:    General: alert and oriented x 3, not in acute distress. Accompanied by daughter.   Patient speaks 46.3 fL    Neutrophil Absolute Prelim 3.19 1.50 - 7.70 x10 (3) uL    Neutrophil Absolute 3.19 1.50 - 7.70 x10(3) uL    Lymphocyte Absolute 3.25 1.00 - 4.00 x10(3) uL    Monocyte Absolute 0.71 0.10 - 1.00 x10(3) uL    Eosinophil Absolute 0.17 0.00 - 0.70 x1

## 2019-06-18 ENCOUNTER — NURSE ONLY (OUTPATIENT)
Dept: ELECTROPHYSIOLOGY | Facility: HOSPITAL | Age: 79
End: 2019-06-18
Attending: Other
Payer: MEDICARE

## 2019-06-18 ENCOUNTER — PROCEDURE VISIT (OUTPATIENT)
Dept: NEUROLOGY | Facility: CLINIC | Age: 79
End: 2019-06-18

## 2019-06-18 DIAGNOSIS — F01.50 VASCULAR DEMENTIA WITHOUT BEHAVIORAL DISTURBANCE (HCC): ICD-10-CM

## 2019-06-18 DIAGNOSIS — I67.9 SMALL VESSEL DISEASE, CEREBROVASCULAR: ICD-10-CM

## 2019-06-18 PROCEDURE — 95816 EEG AWAKE AND DROWSY: CPT | Performed by: OTHER

## 2019-06-22 DIAGNOSIS — E11.8 TYPE 2 DIABETES MELLITUS WITH COMPLICATION, WITHOUT LONG-TERM CURRENT USE OF INSULIN (HCC): Chronic | ICD-10-CM

## 2019-06-24 ENCOUNTER — OFFICE VISIT (OUTPATIENT)
Dept: INTERNAL MEDICINE CLINIC | Facility: CLINIC | Age: 79
End: 2019-06-24
Payer: MEDICARE

## 2019-06-24 VITALS
OXYGEN SATURATION: 94 % | HEIGHT: 62 IN | TEMPERATURE: 99 F | BODY MASS INDEX: 32.11 KG/M2 | DIASTOLIC BLOOD PRESSURE: 70 MMHG | WEIGHT: 174.5 LBS | HEART RATE: 70 BPM | SYSTOLIC BLOOD PRESSURE: 142 MMHG | RESPIRATION RATE: 16 BRPM

## 2019-06-24 DIAGNOSIS — E11.8 TYPE 2 DIABETES MELLITUS WITH COMPLICATION (HCC): Chronic | ICD-10-CM

## 2019-06-24 DIAGNOSIS — M48.061 SPINAL STENOSIS OF LUMBAR REGION, UNSPECIFIED WHETHER NEUROGENIC CLAUDICATION PRESENT: Primary | ICD-10-CM

## 2019-06-24 DIAGNOSIS — M47.816 OSTEOARTHRITIS OF LUMBAR SPINE, UNSPECIFIED SPINAL OSTEOARTHRITIS COMPLICATION STATUS: ICD-10-CM

## 2019-06-24 PROCEDURE — 99214 OFFICE O/P EST MOD 30 MIN: CPT | Performed by: INTERNAL MEDICINE

## 2019-06-24 RX ORDER — BLOOD-GLUCOSE METER
1 EACH MISCELLANEOUS 3 TIMES DAILY
Qty: 1 DEVICE | Refills: 0 | Status: SHIPPED | OUTPATIENT
Start: 2019-06-24 | End: 2020-06-23

## 2019-06-24 NOTE — PATIENT INSTRUCTIONS
She has been given a referral in the past of the neurologist.  She has not followed up with them. Patient and daughter are unclear about the medicines she is taking. Her recent  MRI CT scan of the abdomen pelvis was discussed.

## 2019-06-24 NOTE — PROGRESS NOTES
Mary Turner  1940 is a 67year old female.     Patient presents with:  ER F/U      HPI:   Standing history of back pain has see the neurologist in the past.  Was seen in the emergency room recently    Current Outpatient Medications:  PEG 3350 Or mouth 2 (two) times daily. Disp: 180 tablet Rfl: 0   Cholecalciferol (VITAMIN D3) 2000 units Oral Tab Take 1 tablet by mouth daily.  Disp:  Rfl:    Alcohol Swabs (BD SWAB SINGLE USE REGULAR) Does not apply Pads U ONCE D UTD Disp: 200 each Rfl: 3   Timolol M no weakness, no weight loss or gain. Gastrointestinal:   Reflux no. Abdominal pain no. Appetite change no. Black stools no. Bloating no. Blood in stool no. Change in bowel habits no. Constipation no. Diarrhea no. Distention no. Dysphagia no.  Loss of appe present  -     OP REFERRAL TO [facility]  -     OP REFERRAL PAIN MANGEMENT  -     NEURO - INTERNAL    Osteoarthritis of lumbar spine, unspecified spinal osteoarthritis complication status  -     OP REFERRAL TO [facility]

## 2019-06-25 DIAGNOSIS — E11.8 TYPE 2 DIABETES MELLITUS WITH COMPLICATION (HCC): ICD-10-CM

## 2019-06-25 RX ORDER — CALCIUM CITRATE/VITAMIN D3 200MG-6.25
TABLET ORAL
Qty: 100 STRIP | Refills: 0 | OUTPATIENT
Start: 2019-06-25

## 2019-06-26 RX ORDER — GLIPIZIDE 5 MG/1
TABLET ORAL
Qty: 90 TABLET | Refills: 0 | Status: ON HOLD | OUTPATIENT
Start: 2019-06-26 | End: 2019-08-23

## 2019-06-26 RX ORDER — SITAGLIPTIN 50 MG/1
TABLET, FILM COATED ORAL
Qty: 90 TABLET | Refills: 0 | Status: ON HOLD | OUTPATIENT
Start: 2019-06-26 | End: 2019-08-23

## 2019-06-26 NOTE — TELEPHONE ENCOUNTER
Protocol passed.      Medication(s) to Refill:   Requested Prescriptions     Pending Prescriptions Disp Refills   • JANUVIA 50 MG Oral Tab [Pharmacy Med Name: JANUVIA 50MG TABLETS] 90 tablet 0     Sig: TAKE 1 TABLET BY MOUTH EVERY DAY   • TRAMADOL HCL 50 MG

## 2019-06-27 RX ORDER — TRAMADOL HYDROCHLORIDE 50 MG/1
TABLET ORAL
Qty: 60 TABLET | Refills: 0 | Status: ON HOLD | OUTPATIENT
Start: 2019-06-27 | End: 2019-08-23

## 2019-07-03 ENCOUNTER — TELEPHONE (OUTPATIENT)
Dept: HEMATOLOGY/ONCOLOGY | Facility: HOSPITAL | Age: 79
End: 2019-07-03

## 2019-07-03 NOTE — TELEPHONE ENCOUNTER
Kandace Veloz MD  [facility] [address]             Call daughter, ask if mom is taking OTC iron every other day. Needs labs and F/u with me in 3 months      Unable to reach daughter. Left VM requesting call back to triage line.

## 2019-07-03 NOTE — PROCEDURES
Date of Procedure: 6/18/2019    Procedure: EEG (ELECTROENCEPHALOGRAM)     DX: VASCULAR DEMENTIA WITHOUT BEHAVIORAL DISTURBANCE  HX: PT IS A 68 YO FEMALE WHO PRESENTS FOR MEMORY AND CONFUSION.  PT MEMORY IS GETTING A LITTLE BETTER, DAUGHTER FEELS LIKE SHE IS

## 2019-07-29 ENCOUNTER — APPOINTMENT (OUTPATIENT)
Dept: GENERAL RADIOLOGY | Facility: HOSPITAL | Age: 79
End: 2019-07-29
Attending: EMERGENCY MEDICINE
Payer: MEDICARE

## 2019-07-29 ENCOUNTER — HOSPITAL ENCOUNTER (EMERGENCY)
Facility: HOSPITAL | Age: 79
Discharge: HOME | End: 2019-07-30
Attending: EMERGENCY MEDICINE
Payer: MEDICARE

## 2019-07-29 DIAGNOSIS — M54.42 ACUTE BILATERAL LOW BACK PAIN WITH BILATERAL SCIATICA: Primary | ICD-10-CM

## 2019-07-29 DIAGNOSIS — M54.41 ACUTE BILATERAL LOW BACK PAIN WITH BILATERAL SCIATICA: Primary | ICD-10-CM

## 2019-07-29 DIAGNOSIS — K13.79 ORAL PAIN: ICD-10-CM

## 2019-07-29 DIAGNOSIS — R73.9 HYPERGLYCEMIA: ICD-10-CM

## 2019-07-29 LAB
ANION GAP SERPL CALC-SCNC: 7 MMOL/L (ref 0–18)
BASOPHILS # BLD AUTO: 0.02 X10(3) UL (ref 0–0.2)
BASOPHILS NFR BLD AUTO: 0.3 %
BILIRUB UR QL STRIP.AUTO: NEGATIVE
BUN BLD-MCNC: 14 MG/DL (ref 7–18)
BUN/CREAT SERPL: 9.5 (ref 10–20)
CALCIUM BLD-MCNC: 9.8 MG/DL (ref 8.5–10.1)
CHLORIDE SERPL-SCNC: 98 MMOL/L (ref 98–112)
CLARITY UR REFRACT.AUTO: CLEAR
CO2 SERPL-SCNC: 25 MMOL/L (ref 21–32)
CREAT BLD-MCNC: 1.47 MG/DL (ref 0.55–1.02)
DEPRECATED RDW RBC AUTO: 42 FL (ref 35.1–46.3)
EOSINOPHIL # BLD AUTO: 0.06 X10(3) UL (ref 0–0.7)
EOSINOPHIL NFR BLD AUTO: 0.9 %
ERYTHROCYTE [DISTWIDTH] IN BLOOD BY AUTOMATED COUNT: 14.4 % (ref 11–15)
GLUCOSE BLD-MCNC: 241 MG/DL (ref 70–99)
GLUCOSE BLD-MCNC: 242 MG/DL (ref 70–99)
GLUCOSE UR STRIP.AUTO-MCNC: 50 MG/DL
HCT VFR BLD AUTO: 37.4 % (ref 35–48)
HGB BLD-MCNC: 12.4 G/DL (ref 12–16)
IMM GRANULOCYTES # BLD AUTO: 0.02 X10(3) UL (ref 0–1)
IMM GRANULOCYTES NFR BLD: 0.3 %
KETONES UR STRIP.AUTO-MCNC: NEGATIVE MG/DL
LEUKOCYTE ESTERASE UR QL STRIP.AUTO: NEGATIVE
LYMPHOCYTES # BLD AUTO: 2.61 X10(3) UL (ref 1–4)
LYMPHOCYTES NFR BLD AUTO: 39.9 %
MCH RBC QN AUTO: 27.3 PG (ref 26–34)
MCHC RBC AUTO-ENTMCNC: 33.2 G/DL (ref 31–37)
MCV RBC AUTO: 82.4 FL (ref 80–100)
MONOCYTES # BLD AUTO: 0.59 X10(3) UL (ref 0.1–1)
MONOCYTES NFR BLD AUTO: 9 %
NEUTROPHILS # BLD AUTO: 3.24 X10 (3) UL (ref 1.5–7.7)
NEUTROPHILS # BLD AUTO: 3.24 X10(3) UL (ref 1.5–7.7)
NEUTROPHILS NFR BLD AUTO: 49.6 %
NITRITE UR QL STRIP.AUTO: NEGATIVE
OSMOLALITY SERPL CALC.SUM OF ELEC: 278 MOSM/KG (ref 275–295)
PH UR STRIP.AUTO: 7 [PH] (ref 4.5–8)
PLATELET # BLD AUTO: 243 10(3)UL (ref 150–450)
POTASSIUM SERPL-SCNC: 4.6 MMOL/L (ref 3.5–5.1)
PROT UR STRIP.AUTO-MCNC: 30 MG/DL
RBC # BLD AUTO: 4.54 X10(6)UL (ref 3.8–5.3)
SODIUM SERPL-SCNC: 130 MMOL/L (ref 136–145)
SP GR UR STRIP.AUTO: <1.005 (ref 1–1.03)
UROBILINOGEN UR STRIP.AUTO-MCNC: <2 MG/DL
WBC # BLD AUTO: 6.5 X10(3) UL (ref 4–11)

## 2019-07-29 PROCEDURE — 82962 GLUCOSE BLOOD TEST: CPT

## 2019-07-29 PROCEDURE — 81001 URINALYSIS AUTO W/SCOPE: CPT | Performed by: EMERGENCY MEDICINE

## 2019-07-29 PROCEDURE — 99283 EMERGENCY DEPT VISIT LOW MDM: CPT

## 2019-07-29 PROCEDURE — 36415 COLL VENOUS BLD VENIPUNCTURE: CPT

## 2019-07-29 PROCEDURE — 80048 BASIC METABOLIC PNL TOTAL CA: CPT | Performed by: EMERGENCY MEDICINE

## 2019-07-29 PROCEDURE — 72110 X-RAY EXAM L-2 SPINE 4/>VWS: CPT | Performed by: EMERGENCY MEDICINE

## 2019-07-29 PROCEDURE — 85025 COMPLETE CBC W/AUTO DIFF WBC: CPT | Performed by: EMERGENCY MEDICINE

## 2019-07-29 RX ORDER — TRAMADOL HYDROCHLORIDE 50 MG/1
100 TABLET ORAL ONCE
Status: COMPLETED | OUTPATIENT
Start: 2019-07-29 | End: 2019-07-29

## 2019-07-30 VITALS
WEIGHT: 175 LBS | HEART RATE: 86 BPM | TEMPERATURE: 97 F | SYSTOLIC BLOOD PRESSURE: 157 MMHG | HEIGHT: 63 IN | BODY MASS INDEX: 31.01 KG/M2 | RESPIRATION RATE: 16 BRPM | DIASTOLIC BLOOD PRESSURE: 74 MMHG | OXYGEN SATURATION: 98 %

## 2019-07-30 RX ORDER — LIDOCAINE 50 MG/G
2 PATCH TOPICAL EVERY 24 HOURS
Qty: 10 PATCH | Refills: 0 | Status: SHIPPED | OUTPATIENT
Start: 2019-07-30 | End: 2020-02-13 | Stop reason: ALTCHOICE

## 2019-07-30 RX ORDER — GABAPENTIN 100 MG/1
100 CAPSULE ORAL NIGHTLY
Qty: 30 CAPSULE | Refills: 0 | Status: SHIPPED | OUTPATIENT
Start: 2019-07-30 | End: 2019-10-08 | Stop reason: ALTCHOICE

## 2019-07-30 RX ORDER — TRAMADOL HYDROCHLORIDE 50 MG/1
TABLET ORAL EVERY 6 HOURS PRN
Qty: 10 TABLET | Refills: 0 | Status: SHIPPED | OUTPATIENT
Start: 2019-07-30 | End: 2019-08-06

## 2019-07-30 NOTE — ED INITIAL ASSESSMENT (HPI)
Pt has not been able to walk due to bilateral leg pain, pt has not been taking medication or eating due to difficulty swallowing.

## 2019-07-30 NOTE — ED PROVIDER NOTES
Patient Seen in: [facility] Emergency Department    History   Patient presents with:  Pain (neurologic)    Stated Complaint: bilat leg pain, decreased appetite     HPI    71-year-old female presents with her daughter with complaints of low back pain No      Review of Systems    Positive for stated complaint: bilat leg pain, decreased appetite   Other systems are as noted in HPI. Constitutional and vital signs reviewed. All other systems reviewed and negative except as noted above.     Physical Ex All other components within normal limits   BASIC METABOLIC PANEL (8) - Abnormal; Notable for the following components:    Glucose 242 (*)     Sodium 130 (*)     Creatinine 1.47 (*)     BUN/CREA Ratio 9.5 (*)     GFR, Non- 34 (*)     GF ulceration overlying the hard palate. Discussed supportive care with Orajel. Also discussed importance of taking her medications including her diabetes medications.   Patient shows understanding of plan and agreeable to plan discharged home upon request t

## 2019-08-22 ENCOUNTER — HOSPITAL ENCOUNTER (OUTPATIENT)
Facility: HOSPITAL | Age: 79
Setting detail: OBSERVATION
Discharge: HOME | End: 2019-08-23
Attending: EMERGENCY MEDICINE | Admitting: INTERNAL MEDICINE
Payer: MEDICARE

## 2019-08-22 ENCOUNTER — APPOINTMENT (OUTPATIENT)
Dept: GENERAL RADIOLOGY | Facility: HOSPITAL | Age: 79
End: 2019-08-22
Attending: EMERGENCY MEDICINE
Payer: MEDICARE

## 2019-08-22 ENCOUNTER — APPOINTMENT (OUTPATIENT)
Dept: CV DIAGNOSTICS | Facility: HOSPITAL | Age: 79
End: 2019-08-22
Attending: INTERNAL MEDICINE
Payer: MEDICARE

## 2019-08-22 DIAGNOSIS — R07.9 CHEST PAIN OF UNCERTAIN ETIOLOGY: Primary | ICD-10-CM

## 2019-08-22 DIAGNOSIS — M54.12 CERVICAL RADICULOPATHY: ICD-10-CM

## 2019-08-22 PROBLEM — I10 BENIGN ESSENTIAL HTN: Chronic | Status: ACTIVE | Noted: 2018-10-15

## 2019-08-22 LAB
ALBUMIN SERPL-MCNC: 3.6 G/DL (ref 3.4–5)
ALBUMIN/GLOB SERPL: 0.8 {RATIO} (ref 1–2)
ALP LIVER SERPL-CCNC: 76 U/L (ref 55–142)
ALT SERPL-CCNC: 20 U/L (ref 13–56)
ANION GAP SERPL CALC-SCNC: 6 MMOL/L (ref 0–18)
ANION GAP SERPL CALC-SCNC: 8 MMOL/L (ref 0–18)
AST SERPL-CCNC: 17 U/L (ref 15–37)
ATRIAL RATE: 67 BPM
BASOPHILS # BLD AUTO: 0.02 X10(3) UL (ref 0–0.2)
BASOPHILS NFR BLD AUTO: 0.3 %
BILIRUB SERPL-MCNC: 0.2 MG/DL (ref 0.1–2)
BUN BLD-MCNC: 18 MG/DL (ref 7–18)
BUN BLD-MCNC: 20 MG/DL (ref 7–18)
BUN/CREAT SERPL: 12.8 (ref 10–20)
BUN/CREAT SERPL: 13.2 (ref 10–20)
CALCIUM BLD-MCNC: 8.2 MG/DL (ref 8.5–10.1)
CALCIUM BLD-MCNC: 8.2 MG/DL (ref 8.5–10.1)
CHLORIDE SERPL-SCNC: 104 MMOL/L (ref 98–112)
CHLORIDE SERPL-SCNC: 106 MMOL/L (ref 98–112)
CO2 SERPL-SCNC: 23 MMOL/L (ref 21–32)
CO2 SERPL-SCNC: 25 MMOL/L (ref 21–32)
CREAT BLD-MCNC: 1.41 MG/DL (ref 0.55–1.02)
CREAT BLD-MCNC: 1.52 MG/DL (ref 0.55–1.02)
CRP SERPL-MCNC: 2.02 MG/DL (ref ?–0.3)
DEPRECATED RDW RBC AUTO: 43 FL (ref 35.1–46.3)
EOSINOPHIL # BLD AUTO: 0.1 X10(3) UL (ref 0–0.7)
EOSINOPHIL NFR BLD AUTO: 1.3 %
ERYTHROCYTE [DISTWIDTH] IN BLOOD BY AUTOMATED COUNT: 14.6 % (ref 11–15)
EST. AVERAGE GLUCOSE BLD GHB EST-MCNC: 192 MG/DL (ref 68–126)
GLOBULIN PLAS-MCNC: 4.5 G/DL (ref 2.8–4.4)
GLUCOSE BLD-MCNC: 123 MG/DL (ref 70–99)
GLUCOSE BLD-MCNC: 140 MG/DL (ref 70–99)
GLUCOSE BLD-MCNC: 172 MG/DL (ref 70–99)
GLUCOSE BLD-MCNC: 220 MG/DL (ref 70–99)
HBA1C MFR BLD HPLC: 8.3 % (ref ?–5.7)
HCT VFR BLD AUTO: 39.5 % (ref 35–48)
HGB BLD-MCNC: 12.8 G/DL (ref 12–16)
IMM GRANULOCYTES # BLD AUTO: 0.03 X10(3) UL (ref 0–1)
IMM GRANULOCYTES NFR BLD: 0.4 %
LYMPHOCYTES # BLD AUTO: 2.65 X10(3) UL (ref 1–4)
LYMPHOCYTES NFR BLD AUTO: 35.5 %
M PROTEIN MFR SERPL ELPH: 8.1 G/DL (ref 6.4–8.2)
MCH RBC QN AUTO: 26.6 PG (ref 26–34)
MCHC RBC AUTO-ENTMCNC: 32.4 G/DL (ref 31–37)
MCV RBC AUTO: 82.1 FL (ref 80–100)
MONOCYTES # BLD AUTO: 0.81 X10(3) UL (ref 0.1–1)
MONOCYTES NFR BLD AUTO: 10.8 %
NEUTROPHILS # BLD AUTO: 3.86 X10 (3) UL (ref 1.5–7.7)
NEUTROPHILS # BLD AUTO: 3.86 X10(3) UL (ref 1.5–7.7)
NEUTROPHILS NFR BLD AUTO: 51.7 %
OSMOLALITY SERPL CALC.SUM OF ELEC: 287 MOSM/KG (ref 275–295)
OSMOLALITY SERPL CALC.SUM OF ELEC: 289 MOSM/KG (ref 275–295)
P AXIS: 54 DEGREES
P-R INTERVAL: 182 MS
PLATELET # BLD AUTO: 223 10(3)UL (ref 150–450)
POTASSIUM SERPL-SCNC: 4.6 MMOL/L (ref 3.5–5.1)
POTASSIUM SERPL-SCNC: 4.7 MMOL/L (ref 3.5–5.1)
Q-T INTERVAL: 396 MS
QRS DURATION: 72 MS
QTC CALCULATION (BEZET): 418 MS
R AXIS: 18 DEGREES
RBC # BLD AUTO: 4.81 X10(6)UL (ref 3.8–5.3)
SED RATE-ML: 63 MM/HR (ref 0–25)
SODIUM SERPL-SCNC: 135 MMOL/L (ref 136–145)
SODIUM SERPL-SCNC: 137 MMOL/L (ref 136–145)
T AXIS: 41 DEGREES
TROPONIN I SERPL-MCNC: <0.045 NG/ML (ref ?–0.04)
TROPONIN I SERPL-MCNC: <0.045 NG/ML (ref ?–0.04)
VENTRICULAR RATE: 67 BPM
WBC # BLD AUTO: 7.5 X10(3) UL (ref 4–11)

## 2019-08-22 PROCEDURE — 99220 INITIAL OBSERVATION CARE,LEVL III: CPT | Performed by: INTERNAL MEDICINE

## 2019-08-22 PROCEDURE — 99204 OFFICE O/P NEW MOD 45 MIN: CPT | Performed by: INTERNAL MEDICINE

## 2019-08-22 PROCEDURE — 71045 X-RAY EXAM CHEST 1 VIEW: CPT | Performed by: EMERGENCY MEDICINE

## 2019-08-22 PROCEDURE — 93306 TTE W/DOPPLER COMPLETE: CPT | Performed by: INTERNAL MEDICINE

## 2019-08-22 PROCEDURE — 99214 OFFICE O/P EST MOD 30 MIN: CPT | Performed by: OTHER

## 2019-08-22 RX ORDER — HYDROMORPHONE HYDROCHLORIDE 1 MG/ML
0.5 INJECTION, SOLUTION INTRAMUSCULAR; INTRAVENOUS; SUBCUTANEOUS EVERY 30 MIN PRN
Status: DISCONTINUED | OUTPATIENT
Start: 2019-08-22 | End: 2019-08-24

## 2019-08-22 RX ORDER — ASPIRIN 81 MG/1
324 TABLET, CHEWABLE ORAL ONCE
Status: COMPLETED | OUTPATIENT
Start: 2019-08-22 | End: 2019-08-22

## 2019-08-22 RX ORDER — FAMOTIDINE 20 MG/1
20 TABLET ORAL 2 TIMES DAILY
Status: DISCONTINUED | OUTPATIENT
Start: 2019-08-22 | End: 2019-08-22

## 2019-08-22 RX ORDER — GABAPENTIN 300 MG/1
300 CAPSULE ORAL 2 TIMES DAILY
COMMUNITY
End: 2021-02-23

## 2019-08-22 RX ORDER — GABAPENTIN 100 MG/1
200 CAPSULE ORAL 3 TIMES DAILY
Status: DISCONTINUED | OUTPATIENT
Start: 2019-08-22 | End: 2019-08-23

## 2019-08-22 RX ORDER — HEPARIN SODIUM 5000 [USP'U]/ML
5000 INJECTION, SOLUTION INTRAVENOUS; SUBCUTANEOUS EVERY 8 HOURS SCHEDULED
Status: DISCONTINUED | OUTPATIENT
Start: 2019-08-22 | End: 2019-08-24

## 2019-08-22 RX ORDER — PRAVASTATIN SODIUM 20 MG
20 TABLET ORAL NIGHTLY
Status: DISCONTINUED | OUTPATIENT
Start: 2019-08-22 | End: 2019-08-24

## 2019-08-22 RX ORDER — CALCIUM CARBONATE 200(500)MG
500 TABLET,CHEWABLE ORAL DAILY
Status: DISCONTINUED | OUTPATIENT
Start: 2019-08-22 | End: 2019-08-24

## 2019-08-22 RX ORDER — HYDROCODONE BITARTRATE AND ACETAMINOPHEN 5; 325 MG/1; MG/1
1 TABLET ORAL EVERY 6 HOURS PRN
Status: DISCONTINUED | OUTPATIENT
Start: 2019-08-22 | End: 2019-08-24

## 2019-08-22 RX ORDER — TRAMADOL HYDROCHLORIDE 50 MG/1
50 TABLET ORAL EVERY 12 HOURS PRN
Status: DISCONTINUED | OUTPATIENT
Start: 2019-08-22 | End: 2019-08-24

## 2019-08-22 RX ORDER — DONEPEZIL HYDROCHLORIDE 10 MG/1
10 TABLET, FILM COATED ORAL NIGHTLY
Status: DISCONTINUED | OUTPATIENT
Start: 2019-08-22 | End: 2019-08-24

## 2019-08-22 RX ORDER — HYDRALAZINE HYDROCHLORIDE 25 MG/1
25 TABLET, FILM COATED ORAL EVERY 8 HOURS SCHEDULED
Status: DISCONTINUED | OUTPATIENT
Start: 2019-08-22 | End: 2019-08-24

## 2019-08-22 RX ORDER — ASPIRIN 325 MG
325 TABLET ORAL DAILY
Status: DISCONTINUED | OUTPATIENT
Start: 2019-08-22 | End: 2019-08-24

## 2019-08-22 RX ORDER — METOCLOPRAMIDE HYDROCHLORIDE 5 MG/ML
10 INJECTION INTRAMUSCULAR; INTRAVENOUS EVERY 8 HOURS PRN
Status: DISCONTINUED | OUTPATIENT
Start: 2019-08-22 | End: 2019-08-22

## 2019-08-22 RX ORDER — TIMOLOL MALEATE 5 MG/ML
1 SOLUTION/ DROPS OPHTHALMIC 2 TIMES DAILY
Status: DISCONTINUED | OUTPATIENT
Start: 2019-08-22 | End: 2019-08-24

## 2019-08-22 RX ORDER — METOCLOPRAMIDE HYDROCHLORIDE 5 MG/ML
5 INJECTION INTRAMUSCULAR; INTRAVENOUS EVERY 8 HOURS PRN
Status: DISCONTINUED | OUTPATIENT
Start: 2019-08-22 | End: 2019-08-24

## 2019-08-22 RX ORDER — ONDANSETRON 2 MG/ML
4 INJECTION INTRAMUSCULAR; INTRAVENOUS EVERY 6 HOURS PRN
Status: DISCONTINUED | OUTPATIENT
Start: 2019-08-22 | End: 2019-08-24

## 2019-08-22 RX ORDER — NITROGLYCERIN 0.4 MG/1
0.4 TABLET SUBLINGUAL EVERY 5 MIN PRN
Status: DISCONTINUED | OUTPATIENT
Start: 2019-08-22 | End: 2019-08-24

## 2019-08-22 RX ORDER — FAMOTIDINE 20 MG/1
20 TABLET ORAL DAILY
Status: DISCONTINUED | OUTPATIENT
Start: 2019-08-22 | End: 2019-08-24

## 2019-08-22 RX ORDER — METOPROLOL SUCCINATE 100 MG/1
100 TABLET, EXTENDED RELEASE ORAL DAILY
Status: DISCONTINUED | OUTPATIENT
Start: 2019-08-22 | End: 2019-08-24

## 2019-08-22 RX ORDER — ISOSORBIDE MONONITRATE 30 MG/1
30 TABLET, EXTENDED RELEASE ORAL DAILY
Status: DISCONTINUED | OUTPATIENT
Start: 2019-08-22 | End: 2019-08-24

## 2019-08-22 RX ORDER — ACETAMINOPHEN 325 MG/1
650 TABLET ORAL EVERY 6 HOURS PRN
Status: DISCONTINUED | OUTPATIENT
Start: 2019-08-22 | End: 2019-08-24

## 2019-08-22 RX ORDER — MULTIVIT WITH MINERALS/LUTEIN
1000 TABLET ORAL DAILY
COMMUNITY

## 2019-08-22 RX ORDER — LISINOPRIL 20 MG/1
20 TABLET ORAL 2 TIMES DAILY
Status: DISCONTINUED | OUTPATIENT
Start: 2019-08-22 | End: 2019-08-24

## 2019-08-22 RX ORDER — DEXTROSE MONOHYDRATE 25 G/50ML
50 INJECTION, SOLUTION INTRAVENOUS
Status: DISCONTINUED | OUTPATIENT
Start: 2019-08-22 | End: 2019-08-24

## 2019-08-22 NOTE — CONSULTS
Neurology H&P    [facility] Patient Status:  Observation    1940 MRN [id number]   [facility] 2NE-A Attending Ana Herman MD   Hosp Day # 0 PCP Jodi Ortiz DO     Subjective:  [facility] is a(n) 67year old female w radiculopathy     Dyslipidemia      PMHx:  Past Medical History:   Diagnosis Date   • BUCKY (acute kidney injury) ([facility] [address]    • Anemia 7/18/2014   • Back problem    • Blood disorder     ANEMIA   • Cervical spondylosis with radiculopathy 2/17/2017   • Dehydratio bruising    Objective/Physical Exam:    Vital Signs:  Blood pressure 132/51, pulse 61, temperature 98 °F (36.7 °C), temperature source Oral, resp. rate 18, weight 176 lb 2.4 oz, SpO2 98 %.     Gen: Awake and in no apparent distress  HEENT: moist mucus membr segment of the sacrum. This numbering scheme is documented in the PACs system. 2. Multilevel degenerative changes in the lumbar spine along with epidural lipomatosis in the spinal canal contributes to multilevel spinal canal stenosis.      3. Mild to m SNAP was normal in amplitude and slowed in conduction velocity. The L sural SNAP was low in amplitude and normal in conduction velocity.     The R peroneal motor response was normal in distal latency, low in amplitude and normal in conduction velocity.  The

## 2019-08-22 NOTE — CONSULTS
[facility] Pharmacy Note:  Renal Dose Adjustment for Metoclopramide (REGLAN)    Eleazar Crowell has been prescribed Metoclopramide (REGLAN) 10 mg every 8 hours as needed for nausea.     Estimated Creatinine Clearance: 25.2 mL/min (A) (based on SCr of 1.52 mg/dL (H

## 2019-08-22 NOTE — CONSULTS
[facility] Pharmacy Note:  Renal Dose Adjustment    Emeli Castillo has been prescribed famotidine (PEPCID) 20 mg orally every 12 hours. Estimated Creatinine Clearance: 25.2 mL/min (A) (based on SCr of 1.52 mg/dL (H)).     Her calculated creatinine clearance is

## 2019-08-22 NOTE — PLAN OF CARE
New admission for chest pain that radiates from left neck to left chest/arm, left side and left leg. Patient alert and oriented, Albanian-creole speaking, daughter at bedside to translate. Denies shortness of breath. Room air.  Plan for ECHO, stress test, heather

## 2019-08-22 NOTE — H&P
[facility]  History and Physical     Khushbu Leong Patient Status:  Emergency    1940 MRN [id number]   Location [facility] Attending Tom Patterson MD   Hosp Day # 0 PCP Colton Hummel DO     Chief Complaint She has never used smokeless tobacco. She reports that she does not drink alcohol or use drugs. Family History: History reviewed. No pertinent family history.      Allergies: No Known Allergies    Medications:      No current facility-administered medica METER) w/Device Does not apply Kit Use as directed to test blood sugars twice daily. E11.8 - without insulin Disp: 1 kit Rfl: 0   TRUEPLUS LANCETS 28G Does not apply Misc 1 lancet by Finger stick route 2 (two) times daily.  E11.8 - without insulin Disp: 1 B organomegaly. Neurologic: No focal neurological deficits. CNII-XII grossly intact. Musculoskeletal: Moves all extremities. Cannot fully lift L arm above head due to pain  Extremities: No edema or cyanosis. Integument: No rashes or lesions.    Psychiatric

## 2019-08-22 NOTE — CONSULTS
[facility] Pharmacy Note:  Renal Dose Adjustment for Tramadol Sana Terry    Mary Turner has been prescribed Tramadol (ULTRAM) 50 mg orally every 8 hours as needed for pain. Estimated Creatinine Clearance: 25.2 mL/min (A) (based on SCr of 1.52 mg/dL (H)).

## 2019-08-22 NOTE — CONSULTS
[facility]  Cardiology Consultation    Neoma Ax Patient Status:  Observation    1940 MRN [id number]   [facility] 2NE-A Attending Chrissy Tijerina MD   Hosp Day # 0 PCP Nikhil Barfield DO     Reason for Consultation:  Aundria Phlegm has never smoked. She has never used smokeless tobacco. She reports that she does not drink alcohol or use drugs.     Allergies:  No Known Allergies    Medications:    Current Facility-Administered Medications:   •  HYDROmorphone HCl (DILAUDID) 1 MG/ML inje mg, 30 mg, Oral, Daily  •  hydrALAzine HCl (APRESOLINE) tab 25 mg, 25 mg, Oral, Q8H [address]    Review of Systems:  A comprehensive review of systems was negative if not otherwise mention in above HPI.    BP (!) 188/92 (BP Location: Right arm)   Pulse 74   Temp pain  DM2  Uncontrolled HTN    68 yo with 3 days of diffuse pain from head to chest and arms. While symptoms are atypical for angina she is a diabetic hence needs risk stratification. Prior to that needs better BP control.     Recommendations:  - start Im

## 2019-08-22 NOTE — ED PROVIDER NOTES
Patient Seen in: [facility] Emergency Department    History   Patient presents with:  Chest Pain Angina (cardiovascular)    Stated Complaint: Chest pain, left sided pain    HPI    41-year-old female who has a known history of having cervical radicul History    Tobacco Use      Smoking status: Never Smoker      Smokeless tobacco: Never Used    Alcohol use: No      Alcohol/week: 0.0 standard drinks    Drug use:  No             Review of Systems    Positive for stated complaint: Chest pain, left sided damon -----------         ------                     CBC W/ DIFFERENTIAL[[id number]]                              Final result                 Please view results for these tests on the individual orders.    [address]   R started to have pain in her left arm. She does have a history of neck and back issues/pain. CONCLUSION:  The heart size is normal.  There is slight vascular redistribution without interstitial edema.   There is stable thickening of the horizonta 6/01/2019, 9:58. EDTONY , XR CHEST PA + LAT CHEST (UVY=[id number]), 4/19/2018, 21:32.   INDICATIONS:  Chest pain, left sided pain  PATIENT STATED HISTORY: (As transcribed by Technologist)  Patient's daughter stated that she has been having bilateral leg and dinah

## 2019-08-22 NOTE — ED INITIAL ASSESSMENT (HPI)
Per patient's daughter, who is approved by the patient to translate as patient speaks Creole-Albanian, states patient has been having left sided body pain from the neck to the feet since 11 pm last night.  Patient describes the pain as neuropathy pain that sh

## 2019-08-22 NOTE — PROGRESS NOTES
08/22/19 3163   Clinical Encounter Type   Visited With Patient and family together  ( left blank health care power of  document with the patient's daughter.   Patient speaks [place], but daughter speaks [place] and will help the patient co

## 2019-08-23 ENCOUNTER — APPOINTMENT (OUTPATIENT)
Dept: CV DIAGNOSTICS | Facility: HOSPITAL | Age: 79
End: 2019-08-23
Attending: NURSE PRACTITIONER
Payer: MEDICARE

## 2019-08-23 ENCOUNTER — APPOINTMENT (OUTPATIENT)
Dept: MRI IMAGING | Facility: HOSPITAL | Age: 79
End: 2019-08-23
Attending: Other
Payer: MEDICARE

## 2019-08-23 VITALS
HEART RATE: 61 BPM | WEIGHT: 176.13 LBS | RESPIRATION RATE: 18 BRPM | DIASTOLIC BLOOD PRESSURE: 45 MMHG | OXYGEN SATURATION: 99 % | BODY MASS INDEX: 31 KG/M2 | TEMPERATURE: 98 F | SYSTOLIC BLOOD PRESSURE: 112 MMHG

## 2019-08-23 LAB
BASOPHILS # BLD AUTO: 0.02 X10(3) UL (ref 0–0.2)
BASOPHILS NFR BLD AUTO: 0.3 %
CHOLEST SMN-MCNC: 108 MG/DL (ref ?–200)
DEPRECATED RDW RBC AUTO: 44.2 FL (ref 35.1–46.3)
EOSINOPHIL # BLD AUTO: 0.15 X10(3) UL (ref 0–0.7)
EOSINOPHIL NFR BLD AUTO: 2 %
ERYTHROCYTE [DISTWIDTH] IN BLOOD BY AUTOMATED COUNT: 14.6 % (ref 11–15)
GLUCOSE BLD-MCNC: 114 MG/DL (ref 70–99)
GLUCOSE BLD-MCNC: 145 MG/DL (ref 70–99)
GLUCOSE BLD-MCNC: 201 MG/DL (ref 70–99)
HCT VFR BLD AUTO: 35.7 % (ref 35–48)
HDLC SERPL-MCNC: 37 MG/DL (ref 40–59)
HGB BLD-MCNC: 11.3 G/DL (ref 12–16)
IMM GRANULOCYTES # BLD AUTO: 0.01 X10(3) UL (ref 0–1)
IMM GRANULOCYTES NFR BLD: 0.1 %
LDLC SERPL CALC-MCNC: 47 MG/DL (ref ?–100)
LYMPHOCYTES # BLD AUTO: 3.22 X10(3) UL (ref 1–4)
LYMPHOCYTES NFR BLD AUTO: 43.9 %
MCH RBC QN AUTO: 26.5 PG (ref 26–34)
MCHC RBC AUTO-ENTMCNC: 31.7 G/DL (ref 31–37)
MCV RBC AUTO: 83.6 FL (ref 80–100)
MONOCYTES # BLD AUTO: 0.87 X10(3) UL (ref 0.1–1)
MONOCYTES NFR BLD AUTO: 11.9 %
NEUTROPHILS # BLD AUTO: 3.07 X10 (3) UL (ref 1.5–7.7)
NEUTROPHILS # BLD AUTO: 3.07 X10(3) UL (ref 1.5–7.7)
NEUTROPHILS NFR BLD AUTO: 41.8 %
NONHDLC SERPL-MCNC: 71 MG/DL (ref ?–130)
PLATELET # BLD AUTO: 199 10(3)UL (ref 150–450)
RBC # BLD AUTO: 4.27 X10(6)UL (ref 3.8–5.3)
TRIGL SERPL-MCNC: 119 MG/DL (ref 30–149)
TROPONIN I SERPL-MCNC: <0.045 NG/ML (ref ?–0.04)
VLDLC SERPL CALC-MCNC: 24 MG/DL (ref 0–30)
WBC # BLD AUTO: 7.3 X10(3) UL (ref 4–11)

## 2019-08-23 PROCEDURE — 99214 OFFICE O/P EST MOD 30 MIN: CPT | Performed by: OTHER

## 2019-08-23 PROCEDURE — 72141 MRI NECK SPINE W/O DYE: CPT | Performed by: OTHER

## 2019-08-23 PROCEDURE — 99217 OBSERVATION CARE DISCHARGE: CPT | Performed by: INTERNAL MEDICINE

## 2019-08-23 PROCEDURE — 78452 HT MUSCLE IMAGE SPECT MULT: CPT | Performed by: NURSE PRACTITIONER

## 2019-08-23 PROCEDURE — 99214 OFFICE O/P EST MOD 30 MIN: CPT | Performed by: INTERNAL MEDICINE

## 2019-08-23 PROCEDURE — 93017 CV STRESS TEST TRACING ONLY: CPT | Performed by: NURSE PRACTITIONER

## 2019-08-23 PROCEDURE — 93018 CV STRESS TEST I&R ONLY: CPT | Performed by: NURSE PRACTITIONER

## 2019-08-23 RX ORDER — GLIPIZIDE 5 MG/1
10 TABLET ORAL
COMMUNITY

## 2019-08-23 RX ORDER — ISOSORBIDE MONONITRATE 30 MG/1
30 TABLET, EXTENDED RELEASE ORAL DAILY
Qty: 30 TABLET | Refills: 3 | Status: SHIPPED | OUTPATIENT
Start: 2019-08-24

## 2019-08-23 RX ORDER — AMINOPHYLLINE DIHYDRATE 25 MG/ML
INJECTION, SOLUTION INTRAVENOUS
Status: COMPLETED
Start: 2019-08-23 | End: 2019-08-23

## 2019-08-23 RX ORDER — KETOROLAC TROMETHAMINE 30 MG/ML
15 INJECTION, SOLUTION INTRAMUSCULAR; INTRAVENOUS EVERY 6 HOURS PRN
Status: DISCONTINUED | OUTPATIENT
Start: 2019-08-23 | End: 2019-08-24

## 2019-08-23 RX ORDER — TRAMADOL HYDROCHLORIDE 50 MG/1
50 TABLET ORAL EVERY 12 HOURS PRN
Qty: 30 TABLET | Refills: 0 | Status: SHIPPED | OUTPATIENT
Start: 2019-08-23 | End: 2020-02-13 | Stop reason: ALTCHOICE

## 2019-08-23 RX ORDER — ASPIRIN 81 MG/1
81 TABLET ORAL DAILY
COMMUNITY
End: 2020-02-13 | Stop reason: ALTCHOICE

## 2019-08-23 RX ORDER — HYDRALAZINE HYDROCHLORIDE 25 MG/1
25 TABLET, FILM COATED ORAL EVERY 8 HOURS SCHEDULED
Qty: 90 TABLET | Refills: 3 | Status: SHIPPED | OUTPATIENT
Start: 2019-08-23

## 2019-08-23 RX ORDER — GABAPENTIN 300 MG/1
300 CAPSULE ORAL 3 TIMES DAILY
Status: DISCONTINUED | OUTPATIENT
Start: 2019-08-23 | End: 2019-08-24

## 2019-08-23 RX ORDER — KETOROLAC TROMETHAMINE 30 MG/ML
30 INJECTION, SOLUTION INTRAMUSCULAR; INTRAVENOUS EVERY 6 HOURS PRN
Status: DISCONTINUED | OUTPATIENT
Start: 2019-08-23 | End: 2019-08-24

## 2019-08-23 NOTE — CM/SW NOTE
Pt being evaluated for acute chest pain. Stress test pending. Saw pt for PCP need per pt's daughter request.  Currently pt does not have a PCP established,  Thus, provided a list of PCP in-network with pt's current health plan.     Pt lives with daughter,

## 2019-08-23 NOTE — PROGRESS NOTES
CARDIODIAGNOSTIC PRELIMINARY REPORT:      With the assistance of  Leticia Mane # [id number], Lexiscan injection completed with no new EKG changes noted;   No arrhythmias      Base:  128/68, HR: 62;  Peak:  136/68, HR: 83  C/O nausea and headache following in

## 2019-08-23 NOTE — PLAN OF CARE
Problem: Patient/Family Goals  Goal: Patient/Family Long Term Goal  Description  Patient's Long Term Goal: go home    Interventions:  - ambulation  - SCD  - Neuro consult  -ECHO  - stress test  - See additional Care Plan goals for specific interventions [place] fall precautions as indicated by assessment.  - Educate pt/family on patient safety including physical limitations  - Instruct pt to call for assistance with activity based on assessment  - Modify environment to reduce risk of injury  - Provide a

## 2019-08-23 NOTE — PROGRESS NOTES
[facility] HOSPITALIST  Progress Note     Sowmya Sam Patient Status:  Observation    1940 MRN [id number]   [facility] 2NE-A Attending Luis Buck MD   Hosp Day # 0 PCP Mathieu Rendon DO     Chief Complaint: L sided pain, chest <0.045 <0.045            Imaging: Imaging data reviewed in Epic.     Medications:   • Calcium Carbonate Antacid  500 mg Oral Daily   • Donepezil HCl  10 mg Oral Nightly   • lidocaine-menthol  2 patch Transdermal Daily   • lisinopril  20 mg Oral BID   • Meto

## 2019-08-23 NOTE — PLAN OF CARE
Problem: Patient/Family Goals  Goal: Patient/Family Long Term Goal  Description  Patient's Long Term Goal: go home    Interventions:  - ambulation  - SCD  - Neuro consult  -ECHO  - stress test  - See additional Care Plan goals for specific interventions unsuccessful or patient reports new pain  - Anticipate increased pain with activity and pre-medicate as appropriate  8/23/2019 1832 by Liz Alanis, RN  Outcome: Adequate for Discharge  8/23/2019 1758 by Liz Alanis, RN  Outcome: Ludmila Scherer

## 2019-08-23 NOTE — PLAN OF CARE
Pt. Is alert and oriented times four. Lungs clear on auscultation. Pt. Continues to c/o left arm/ shoulder pain per daughter who is interpreting. She is sinus rhythm on monitor.  Pt. Had treadmill breakfast and will go for stress test.

## 2019-08-23 NOTE — DISCHARGE SUMMARY
[facility] HOSPITALIST  DISCHARGE SUMMARY     Bolivar Chang Patient Status:  Observation    1940 MRN [id number]   [facility] 2NE-A Attending Marky Romero MD   Hosp Day # 0 PCP Shaina Fuentes DO     Date of Admission: 2019  Da IMDUR      Take 1 tablet (30 mg total) by mouth daily.    Quantity:  30 tablet  Refills:  3        CHANGE how you take these medications      Instructions Prescription details   traMADol HCl 50 MG Tabs  Commonly known as:  ULTRAM  What changed:  See the new 180 tablet  Refills:  0     Metoprolol Succinate ER 50 MG Tb24  Commonly known as: Toprol XL      Take 2 tablets (100 mg total) by mouth daily.    Quantity:  180 tablet  Refills:  0     Pravastatin Sodium 20 MG Tabs  Commonly known as:  PRAVACHOL      Take 35170  [phone]      Call on day of discharge, make appointment for 5-6 weeks    Appointments for Next 30 Days 8/25/2019 - 9/24/2019      Date Arrival Time Visit Type Length Department Provider     9/24/2019  9:30 AM  EXAM - ESTABLISHED PATIENT [[id number]]

## 2019-08-23 NOTE — PROGRESS NOTES
[id number] Y[facility] Tre[facility]ño Neurology Progress Note    Bolivar Chang Patient Status:  Observation    1940 MRN [id number]   [facility] 2NE-A Attending Marky Romero MD   [facility] Day # 0 PCP Shaina Fuentes DO     Subjective:  Nicole Arauz lidocaine-menthol  2 patch Transdermal Daily   • lisinopril  20 mg Oral BID   • Metoprolol Succinate ER  100 mg Oral Daily   • Pravastatin Sodium  20 mg Oral Nightly   • Timolol Maleate  1 drop Both Eyes BID   • aspirin  325 mg Oral Daily   • Heparin Sodiu above  Imaging: MRI c spine as above    Assessment/Plan:  Left occipital neuralgia/cervicogenic  Left cervical radiculopathy  Cervical spinal stenosis    Increase gabapentin to 300mg TID. Higher doses are likely not appropriate given her renal function.  If

## 2019-08-23 NOTE — PLAN OF CARE
Assumed care of Pt. At 2330. Pt. Is Axox2 and on room air with O2 sat of 96%. Pt. Has a hx of dementia. Pt. Only speaks Welsh-Creole speaking. Daughter is at the bedside and translating. Pt. Has clear bilateral breath sounds. Pt. Has NSR with HR of 66.  Pt Progressing     Problem: PAIN - ADULT  Goal: Verbalizes/displays adequate comfort level or patient's stated pain goal  Description  INTERVENTIONS:  - Encourage pt to monitor pain and request assistance  - Assess pain using appropriate pain scale  - Adminis

## 2019-08-23 NOTE — PROGRESS NOTES
[facility] Progress Note    Subjective:  Still with some neck and L arm pain per daughter, changes with movement.      Objective:  /50 (BP Location: Right arm)   Pulse 68   Temp 97.5 °F (36.4 °C) (Oral)   Resp 18   Wt 176 lb 2.4 oz (79.9 kg) Janice Betts MD

## 2019-08-26 RX ORDER — TRAMADOL HYDROCHLORIDE 50 MG/1
TABLET ORAL
Qty: 60 TABLET | Refills: 0 | OUTPATIENT
Start: 2019-08-26

## 2019-10-01 ENCOUNTER — TELEPHONE (OUTPATIENT)
Dept: CARDIOLOGY | Age: 79
End: 2019-10-01

## 2019-10-07 RX ORDER — FAMOTIDINE 20 MG/1
20 TABLET, FILM COATED ORAL 2 TIMES DAILY
COMMUNITY

## 2019-10-07 RX ORDER — ISOSORBIDE MONONITRATE 30 MG/1
30 TABLET, EXTENDED RELEASE ORAL DAILY
COMMUNITY
Start: 2019-08-24

## 2019-10-07 RX ORDER — HYDRALAZINE HYDROCHLORIDE 25 MG/1
25 TABLET, FILM COATED ORAL 3 TIMES DAILY
COMMUNITY
Start: 2019-08-23

## 2019-10-07 RX ORDER — PRAVASTATIN SODIUM 20 MG
20 TABLET ORAL AT BEDTIME
COMMUNITY
Start: 2019-05-01

## 2019-10-07 RX ORDER — DONEPEZIL HYDROCHLORIDE 10 MG/1
10 TABLET, FILM COATED ORAL AT BEDTIME
COMMUNITY
Start: 2019-05-29

## 2019-10-07 RX ORDER — METOPROLOL SUCCINATE 50 MG/1
100 TABLET, EXTENDED RELEASE ORAL DAILY
COMMUNITY
Start: 2019-05-01

## 2019-10-07 RX ORDER — GABAPENTIN 300 MG/1
300 CAPSULE ORAL 2 TIMES DAILY
COMMUNITY

## 2019-10-07 RX ORDER — CALCIUM CARBONATE 500 MG/1
500 TABLET, CHEWABLE ORAL DAILY
COMMUNITY
Start: 2019-05-01

## 2019-10-07 RX ORDER — TIMOLOL MALEATE 5 MG/ML
SOLUTION/ DROPS OPHTHALMIC
COMMUNITY
Start: 2018-09-21

## 2019-10-07 RX ORDER — CHOLECALCIFEROL (VITAMIN D3) 125 MCG
1 CAPSULE ORAL DAILY
COMMUNITY

## 2019-10-07 RX ORDER — MULTIVIT WITH MINERALS/LUTEIN
1000 TABLET ORAL DAILY
COMMUNITY

## 2019-10-07 RX ORDER — LISINOPRIL 20 MG/1
20 TABLET ORAL 2 TIMES DAILY
COMMUNITY
Start: 2019-03-19

## 2019-10-07 RX ORDER — ALENDRONATE SODIUM 70 MG/1
70 TABLET ORAL
COMMUNITY
Start: 2019-05-02

## 2019-10-07 RX ORDER — ASPIRIN 81 MG/1
81 TABLET ORAL DAILY
COMMUNITY

## 2019-10-07 RX ORDER — TRAMADOL HYDROCHLORIDE 50 MG/1
50 TABLET ORAL PRN
COMMUNITY
Start: 2019-08-23

## 2019-10-07 RX ORDER — GLIPIZIDE 10 MG/1
10 TABLET ORAL DAILY
COMMUNITY

## 2019-10-08 ENCOUNTER — TELEPHONE (OUTPATIENT)
Dept: CARDIOLOGY | Age: 79
End: 2019-10-08

## 2019-10-08 ENCOUNTER — OFFICE VISIT (OUTPATIENT)
Dept: CARDIOLOGY | Age: 79
End: 2019-10-08

## 2019-10-08 ENCOUNTER — HOSPITAL ENCOUNTER (OUTPATIENT)
Age: 79
Discharge: HOME | End: 2019-10-08
Payer: MEDICARE

## 2019-10-08 ENCOUNTER — APPOINTMENT (OUTPATIENT)
Dept: GENERAL RADIOLOGY | Age: 79
End: 2019-10-08
Attending: PHYSICIAN ASSISTANT
Payer: MEDICARE

## 2019-10-08 VITALS
HEIGHT: 64 IN | SYSTOLIC BLOOD PRESSURE: 147 MMHG | HEART RATE: 87 BPM | DIASTOLIC BLOOD PRESSURE: 69 MMHG | BODY MASS INDEX: 30.22 KG/M2 | WEIGHT: 177 LBS

## 2019-10-08 VITALS
BODY MASS INDEX: 30.39 KG/M2 | SYSTOLIC BLOOD PRESSURE: 153 MMHG | TEMPERATURE: 98 F | OXYGEN SATURATION: 96 % | DIASTOLIC BLOOD PRESSURE: 57 MMHG | RESPIRATION RATE: 20 BRPM | WEIGHT: 178 LBS | HEIGHT: 64 IN | HEART RATE: 72 BPM

## 2019-10-08 DIAGNOSIS — D64.9 ANEMIA, UNSPECIFIED TYPE: ICD-10-CM

## 2019-10-08 DIAGNOSIS — M54.50 LUMBAR PAIN WITH RADIATION DOWN BOTH LEGS: Primary | ICD-10-CM

## 2019-10-08 DIAGNOSIS — E11.9 TYPE 2 DIABETES MELLITUS WITHOUT COMPLICATION, WITHOUT LONG-TERM CURRENT USE OF INSULIN (CMD): ICD-10-CM

## 2019-10-08 DIAGNOSIS — G62.9 NEUROPATHY: Primary | ICD-10-CM

## 2019-10-08 DIAGNOSIS — F01.50 VASCULAR DEMENTIA WITHOUT BEHAVIORAL DISTURBANCE (CMD): ICD-10-CM

## 2019-10-08 DIAGNOSIS — M79.605 LUMBAR PAIN WITH RADIATION DOWN BOTH LEGS: Primary | ICD-10-CM

## 2019-10-08 DIAGNOSIS — E78.5 DYSLIPIDEMIA: ICD-10-CM

## 2019-10-08 DIAGNOSIS — I73.9 CLAUDICATION (CMD): ICD-10-CM

## 2019-10-08 DIAGNOSIS — M79.604 PAIN IN BOTH LOWER EXTREMITIES: ICD-10-CM

## 2019-10-08 DIAGNOSIS — M54.40 BACK PAIN OF LUMBAR REGION WITH SCIATICA: ICD-10-CM

## 2019-10-08 DIAGNOSIS — E78.00 HYPERCHOLESTEREMIA: ICD-10-CM

## 2019-10-08 DIAGNOSIS — N18.4 STAGE 4 CHRONIC KIDNEY DISEASE (CMD): ICD-10-CM

## 2019-10-08 DIAGNOSIS — M51.37 DDD (DEGENERATIVE DISC DISEASE), LUMBOSACRAL: ICD-10-CM

## 2019-10-08 DIAGNOSIS — I10 BENIGN ESSENTIAL HTN: ICD-10-CM

## 2019-10-08 DIAGNOSIS — M79.604 LUMBAR PAIN WITH RADIATION DOWN BOTH LEGS: Primary | ICD-10-CM

## 2019-10-08 DIAGNOSIS — M79.605 PAIN IN BOTH LOWER EXTREMITIES: ICD-10-CM

## 2019-10-08 PROCEDURE — 3078F DIAST BP <80 MM HG: CPT | Performed by: INTERNAL MEDICINE

## 2019-10-08 PROCEDURE — 72100 X-RAY EXAM L-S SPINE 2/3 VWS: CPT | Performed by: PHYSICIAN ASSISTANT

## 2019-10-08 PROCEDURE — 96372 THER/PROPH/DIAG INJ SC/IM: CPT

## 2019-10-08 PROCEDURE — 99214 OFFICE O/P EST MOD 30 MIN: CPT

## 2019-10-08 PROCEDURE — 3077F SYST BP >= 140 MM HG: CPT | Performed by: INTERNAL MEDICINE

## 2019-10-08 PROCEDURE — 99215 OFFICE O/P EST HI 40 MIN: CPT | Performed by: INTERNAL MEDICINE

## 2019-10-08 RX ORDER — ALOGLIPTIN 25 MG/1
TABLET, FILM COATED ORAL
Refills: 1 | COMMUNITY
Start: 2019-10-02

## 2019-10-08 RX ORDER — METHYLPREDNISOLONE 4 MG/1
TABLET ORAL
Qty: 1 PACKAGE | Refills: 0 | Status: SHIPPED | OUTPATIENT
Start: 2019-10-08 | End: 2020-02-13 | Stop reason: ALTCHOICE

## 2019-10-08 RX ORDER — KETOROLAC TROMETHAMINE 30 MG/ML
30 INJECTION, SOLUTION INTRAMUSCULAR; INTRAVENOUS ONCE
Status: COMPLETED | OUTPATIENT
Start: 2019-10-08 | End: 2019-10-08

## 2019-10-08 RX ORDER — DEXAMETHASONE SODIUM PHOSPHATE 4 MG/ML
10 VIAL (ML) INJECTION ONCE
Status: COMPLETED | OUTPATIENT
Start: 2019-10-08 | End: 2019-10-08

## 2019-10-08 RX ORDER — DULAGLUTIDE 0.75 MG/.5ML
INJECTION, SOLUTION SUBCUTANEOUS
Refills: 0 | COMMUNITY
Start: 2019-09-18

## 2019-10-08 SDOH — HEALTH STABILITY: PHYSICAL HEALTH: ON AVERAGE, HOW MANY DAYS PER WEEK DO YOU ENGAGE IN MODERATE TO STRENUOUS EXERCISE (LIKE A BRISK WALK)?: 0 DAYS

## 2019-10-08 SDOH — HEALTH STABILITY: PHYSICAL HEALTH: ON AVERAGE, HOW MANY MINUTES DO YOU ENGAGE IN EXERCISE AT THIS LEVEL?: 0 MIN

## 2019-10-08 ASSESSMENT — PATIENT HEALTH QUESTIONNAIRE - PHQ9
2. FEELING DOWN, DEPRESSED OR HOPELESS: SEVERAL DAYS
SUM OF ALL RESPONSES TO PHQ9 QUESTIONS 1 AND 2: 1
1. LITTLE INTEREST OR PLEASURE IN DOING THINGS: NOT AT ALL
SUM OF ALL RESPONSES TO PHQ9 QUESTIONS 1 AND 2: 1

## 2019-10-08 NOTE — ED PROVIDER NOTES
Patient Seen in: THE [facility] [place]      History   Patient presents with:  Back Pain (musculoskeletal)    Stated Complaint: 'LONG TIME\": bilat leg pain from thigh to bottom of feet    HPI    78-year-old female here with complaint of bi history reviewed with patient/caregiver and is not pertinent to presenting problem. The patient's medication list, past medical history and social history elements  as listed in today's nurse's notes are reviewed and agree.    The patient's family histor oriented to person, place, and time. Skin: Skin is warm. Capillary refill takes less than 2 seconds. Psychiatric: She has a normal mood and affect. Her behavior is normal. Judgment and thought content normal.   Nursing note and vitals reviewed. prescribed in tandem with over-the-counter Tylenol. Due to your kidney issues you are unable to take nonsteroidal anti-inflammatories. We will give you back and pain specialist for potential injections and physical therapy.   This case was discussed with

## 2019-10-08 NOTE — ED INITIAL ASSESSMENT (HPI)
Pt c/o chronic right low back pain radiating down right leg into toes. States also has pain in left side back, leg and foot, but not as bad as right. Pt moving freely in bed.

## 2019-12-05 DIAGNOSIS — M85.80 OSTEOPENIA, UNSPECIFIED LOCATION: ICD-10-CM

## 2019-12-06 RX ORDER — CHOLECALCIFEROL (VITAMIN D3) 125 MCG
CAPSULE ORAL
Qty: 90 TABLET | Refills: 0 | Status: SHIPPED | OUTPATIENT
Start: 2019-12-06 | End: 2020-04-09

## 2019-12-06 NOTE — TELEPHONE ENCOUNTER
Failed protocol     Last refill:  Entered historically 2000 units daily    LOV:   6/24/2019 Dr Braden Randall RTC 3 months  No FOV scheduled

## 2020-02-13 ENCOUNTER — HOSPITAL ENCOUNTER (OUTPATIENT)
Age: 80
Discharge: HOME | End: 2020-02-13
Attending: FAMILY MEDICINE
Payer: MEDICARE

## 2020-02-13 VITALS
OXYGEN SATURATION: 99 % | DIASTOLIC BLOOD PRESSURE: 65 MMHG | HEART RATE: 80 BPM | SYSTOLIC BLOOD PRESSURE: 154 MMHG | TEMPERATURE: 99 F | RESPIRATION RATE: 20 BRPM

## 2020-02-13 DIAGNOSIS — J01.90 ACUTE NON-RECURRENT SINUSITIS, UNSPECIFIED LOCATION: ICD-10-CM

## 2020-02-13 DIAGNOSIS — J11.1 INFLUENZA: Primary | ICD-10-CM

## 2020-02-13 DIAGNOSIS — J40 BRONCHITIS: ICD-10-CM

## 2020-02-13 LAB
POCT INFLUENZA A: POSITIVE
POCT INFLUENZA B: NEGATIVE

## 2020-02-13 PROCEDURE — 99214 OFFICE O/P EST MOD 30 MIN: CPT

## 2020-02-13 PROCEDURE — 94640 AIRWAY INHALATION TREATMENT: CPT

## 2020-02-13 PROCEDURE — 87502 INFLUENZA DNA AMP PROBE: CPT | Performed by: FAMILY MEDICINE

## 2020-02-13 RX ORDER — PREDNISONE 20 MG/1
40 TABLET ORAL DAILY
Qty: 10 TABLET | Refills: 0 | Status: SHIPPED | OUTPATIENT
Start: 2020-02-13 | End: 2020-02-18

## 2020-02-13 RX ORDER — OSELTAMIVIR PHOSPHATE 75 MG/1
75 CAPSULE ORAL 2 TIMES DAILY
Qty: 10 CAPSULE | Refills: 0 | Status: SHIPPED | OUTPATIENT
Start: 2020-02-13 | End: 2020-02-18

## 2020-02-13 RX ORDER — IPRATROPIUM BROMIDE AND ALBUTEROL SULFATE 2.5; .5 MG/3ML; MG/3ML
3 SOLUTION RESPIRATORY (INHALATION) ONCE
Status: COMPLETED | OUTPATIENT
Start: 2020-02-13 | End: 2020-02-13

## 2020-02-13 RX ORDER — ALOGLIPTIN 12.5 MG/1
12.5 TABLET, FILM COATED ORAL DAILY
COMMUNITY

## 2020-02-13 RX ORDER — ALBUTEROL SULFATE 90 UG/1
2 AEROSOL, METERED RESPIRATORY (INHALATION) EVERY 6 HOURS PRN
Qty: 1 INHALER | Refills: 0 | Status: SHIPPED | OUTPATIENT
Start: 2020-02-13

## 2020-02-13 RX ORDER — AZITHROMYCIN 250 MG/1
TABLET, FILM COATED ORAL
Qty: 1 PACKAGE | Refills: 0 | Status: SHIPPED | OUTPATIENT
Start: 2020-02-13 | End: 2020-02-18

## 2020-02-13 NOTE — ED INITIAL ASSESSMENT (HPI)
Complains of cold symptoms, headaches, body aches, cough and fever the last three days. States gets short of breath when coughing.

## 2020-02-13 NOTE — ED PROVIDER NOTES
Patient Seen in: THE [facility]      History   Patient presents with:   Body ache and/or chills    Stated Complaint: flu symptoms    HPI    18-year-old female with history of diabetes, hypertension, renal disease and GERD presents fo of Systems    Positive for stated complaint: flu symptoms  Other systems are as noted in HPI. Constitutional and vital signs reviewed. All other systems reviewed and negative except as noted above.     Physical Exam     ED Triage Vitals [02/13/20 1019 prescribed. Follow-up with the PCP if not better.   Disposition and Plan     Clinical Impression:  Influenza  (primary encounter diagnosis)  Bronchitis  Acute non-recurrent sinusitis, unspecified location    Disposition:  Discharge  2/13/2020 11:11 am    F

## 2020-03-02 RX ORDER — DONEPEZIL HYDROCHLORIDE 10 MG/1
TABLET, FILM COATED ORAL
Qty: 90 TABLET | Refills: 0 | Status: SHIPPED | OUTPATIENT
Start: 2020-03-02 | End: 2020-06-02

## 2020-03-02 NOTE — TELEPHONE ENCOUNTER
Medication: Donepezil 10 mg    Date of last refill: 05/29/2019 with 1 addt refill # 80      Last office visit: 05/29/2019 ofc   Due back to clinic per last office note:  RTN in 6 months  Date next office visit scheduled:        Last OV note recommendation:

## 2020-04-08 DIAGNOSIS — M85.80 OSTEOPENIA, UNSPECIFIED LOCATION: ICD-10-CM

## 2020-04-08 NOTE — TELEPHONE ENCOUNTER
Vit. D3 50 mcg  Last OV relevant to medication: 11-26-18  Last refill date: 12-6-19 #/refills: 0  When pt was asked to return for OV: 4 wks  Upcoming appt/reason: none  Recent labs: 10-15-18: Vit.  D

## 2020-04-09 RX ORDER — CHOLECALCIFEROL (VITAMIN D3) 125 MCG
CAPSULE ORAL
Qty: 90 TABLET | Refills: 0 | Status: SHIPPED | OUTPATIENT
Start: 2020-04-09

## 2020-06-01 RX ORDER — ISOPROPYL ALCOHOL 0.75 G/1
SWAB TOPICAL
Qty: 300 EACH | Refills: 0 | Status: SHIPPED | OUTPATIENT
Start: 2020-06-01

## 2020-06-02 NOTE — TELEPHONE ENCOUNTER
Medication: DONEPEZIL HCL 10 MG Oral Tab    Date of last refill: 3/2/2020 (#90/0)  [place] 1/6/2483    Last office visit: 5/29/219  Due back to clinic per last office note: 6 months  Date next office visit scheduled:

## 2020-06-03 NOTE — TELEPHONE ENCOUNTER
Review of record indicates pt is seeing a new neurologist.  We are not in her health plan. Pharmacy misdirected refill, future refills should be declined.

## 2020-08-24 ENCOUNTER — TELEPHONE (OUTPATIENT)
Dept: INTERNAL MEDICINE CLINIC | Facility: CLINIC | Age: 80
End: 2020-08-24

## 2020-08-24 NOTE — TELEPHONE ENCOUNTER
[employer] called and stated that the patient was admitted to [facility] for positive COVID19 on 08/19. Just an FYI to the doctor.

## 2021-01-27 DIAGNOSIS — Z23 NEED FOR VACCINATION: ICD-10-CM

## 2021-06-30 ENCOUNTER — TELEPHONE (OUTPATIENT)
Dept: NEPHROLOGY | Facility: CLINIC | Age: 81
End: 2021-06-30

## 2021-06-30 NOTE — TELEPHONE ENCOUNTER
Lissy Moore with Dr. Macias's office called inquiring if Dr. Femi David would be comfortable with pt increasing Jardiance to 25mg BID.  Pt has not been seen here since 2019 so Lissy Moore was advised we may not be able to make any recommendations based on that but a mes

## 2021-07-02 NOTE — TELEPHONE ENCOUNTER
Left VM for endo- have not seen pt in 2 yrs; no labs for review; no way to recommend jardiance dosing- azul hernandez

## 2024-01-29 ENCOUNTER — HOSPITAL ENCOUNTER (OUTPATIENT)
Age: 84
Discharge: HOME | End: 2024-01-29
Payer: MEDICARE

## 2024-01-29 VITALS
TEMPERATURE: 99 F | SYSTOLIC BLOOD PRESSURE: 132 MMHG | HEART RATE: 83 BPM | BODY MASS INDEX: 32.43 KG/M2 | DIASTOLIC BLOOD PRESSURE: 62 MMHG | HEIGHT: 63 IN | WEIGHT: 183 LBS | OXYGEN SATURATION: 95 % | RESPIRATION RATE: 18 BRPM

## 2024-01-29 DIAGNOSIS — L23.9 ALLERGIC CONTACT DERMATITIS, UNSPECIFIED TRIGGER: Primary | ICD-10-CM

## 2024-01-29 PROCEDURE — 99203 OFFICE O/P NEW LOW 30 MIN: CPT

## 2024-01-29 RX ORDER — ROSUVASTATIN CALCIUM 10 MG/1
10 TABLET, COATED ORAL NIGHTLY
COMMUNITY

## 2024-01-29 RX ORDER — DULOXETIN HYDROCHLORIDE 60 MG/1
60 CAPSULE, DELAYED RELEASE ORAL DAILY
COMMUNITY
Start: 2022-08-12

## 2024-01-29 RX ORDER — EXENATIDE 2 MG/.85ML
2 INJECTION, SUSPENSION, EXTENDED RELEASE SUBCUTANEOUS WEEKLY
COMMUNITY
Start: 2023-11-06

## 2024-01-29 RX ORDER — EMPAGLIFLOZIN 25 MG/1
1 TABLET, FILM COATED ORAL DAILY
COMMUNITY

## 2024-01-29 RX ORDER — TRIAMCINOLONE ACETONIDE 1 MG/G
CREAM TOPICAL 2 TIMES DAILY
Qty: 80 G | Refills: 0 | Status: SHIPPED | OUTPATIENT
Start: 2024-01-29

## 2024-01-29 RX ORDER — METHYLPREDNISOLONE 4 MG/1
TABLET ORAL
Qty: 21 TABLET | Refills: 0 | Status: SHIPPED | OUTPATIENT
Start: 2024-01-29

## 2024-01-29 RX ORDER — EPINEPHRINE 0.3 MG/.3ML
0.3 INJECTION SUBCUTANEOUS
Qty: 1 EACH | Refills: 0 | Status: SHIPPED | OUTPATIENT
Start: 2024-01-29 | End: 2024-02-28

## 2024-01-29 NOTE — ED PROVIDER NOTES
Patient Seen in: [facility] [place]      History     Chief Complaint   Patient presents with    Rash     Stated Complaint: rash    Subjective:   84-year-old female presents to immediate care for rash to her upper back.  Rash crosses midline noticed it 2 to 3 days ago it is itchy and painful.            Objective:   Past Medical History:   Diagnosis Date    BUCKY (acute kidney injury) (HCC)     Anemia 7/18/2014    Back problem     Blood disorder     ANEMIA    Cervical spondylosis with radiculopathy 2/17/2017    Dehydration     Depression     Esophageal reflux     Glaucoma     bilateral    High blood pressure     High cholesterol     Muscle weakness     Osteoarthritis     Renal disorder     STAGE 4    Stroke (HCC)     intermittent left arm neuropathy    Type II or unspecified type diabetes mellitus without mention of complication, not stated as uncontrolled     Unspecified essential hypertension     Visual impairment               Past Surgical History:   Procedure Laterality Date    CATARACT      COLONOSCOPY N/A 3/20/2019    Procedure: COLONOSCOPY;  Surgeon: Luis Espinal DO;  Location:  ENDOSCOPY    EYE SURGERY      2006 cataract bilateral    OTHER      Left temporal biopsy                No pertinent social history.            Review of Systems   Constitutional: Negative.    Respiratory: Negative.     Cardiovascular: Negative.    Gastrointestinal: Negative.    Skin: Negative.    Neurological: Negative.        Positive for stated complaint: rash  Other systems are as noted in HPI.  Constitutional and vital signs reviewed.      All other systems reviewed and negative except as noted above.    Physical Exam     ED Triage Vitals [01/29/24 1332]   /62   Pulse 83   Resp 18   Temp 98.7 °F (37.1 °C)   Temp src Tympanic   SpO2 95 %   O2 Device None (Room air)       Current:/62   Pulse 83   Temp 98.7 °F (37.1 °C) (Tympanic)   Resp 18   Ht 160 cm (5' 3\")   Wt 83 kg   SpO2 95%   BMI 32.42 kg/m²          Physical Exam  Vitals and nursing note reviewed.   Constitutional:       General: She is not in acute distress.  HENT:      Head: Normocephalic.   Cardiovascular:      Rate and Rhythm: Normal rate.   Pulmonary:      Effort: Pulmonary effort is normal.   Musculoskeletal:         General: Normal range of motion.   Skin:     General: Skin is warm and dry.      Findings: Rash present. Rash is papular.      Comments: Rash crosses midline   Neurological:      General: No focal deficit present.      Mental Status: She is alert and oriented to person, place, and time.               ED Course   Labs Reviewed - No data to display                   MDM        Medical Decision Making  Pertinent Labs & Imaging studies reviewed. (See chart for details).  Patient coming in with rash.   Differential diagnosis includes contact dermatitis, shingles.  Will treat for allergic dermatitis.  Will discharge on Medrol Dosepak, triamcinolone. Patient is comfortable with this plan.     Overall Pt looks good. Non-toxic, well-hydrated and in no respiratory distress. Vital signs are reassuring. Exam is reassuring. I do not believe pt requires and additional diagnostic studies or intervention. I believe pt can be discharged home to continue evaluation as an outpatient. Follow-up provider given. Discharge instructions given and reviewed. Return for any problems. All understand and agreewith the plan.     Please note that this report has been produced using speech recognition software and may contain errors related to that system including, but not limited to, errors in grammar, punctuation, and spelling, as well as words and phrases that possibly may have been recognized inappropriately. If there are any questions or concerns, contact the dictating provider for clarification.       Problems Addressed:  Allergic contact dermatitis, unspecified trigger: acute illness or injury    Amount and/or Complexity of Data Reviewed  Independent  Historian: caregiver    Risk  OTC drugs.  Prescription drug management.        Disposition and Plan     Clinical Impression:  1. Allergic contact dermatitis, unspecified trigger         Disposition:  Discharge  1/29/2024  2:11 pm    Follow-up:  No follow-up provider specified.        Medications Prescribed:  Discharge Medication List as of 1/29/2024  2:13 PM        START taking these medications    Details   methylPREDNISolone (MEDROL) 4 MG Oral Tablet Therapy Pack Dosepack: take as directed, Normal, Disp-21 tablet, R-0      triamcinolone 0.1 % External Cream Apply topically 2 (two) times daily., Normal, Disp-80 g, R-0      EPINEPHrine 0.3 MG/0.3ML Injection Solution Auto-injector Inject 0.3 mL (1 each total) into the muscle daily as needed., Normal, Disp-1 each, R-0

## 2024-01-29 NOTE — ED INITIAL ASSESSMENT (HPI)
Daughter states she noticed a rash on pt's back about 3 days ago.   Giving her allergy med with no relief.    Area is itching.

## 2024-01-29 NOTE — DISCHARGE INSTRUCTIONS
Keep area clean and dry.  Take medications as prescribed.  If there is any throat swelling difficulty breathing, lip swelling use EpiPen and call 911.

## 2024-01-30 NOTE — ED NOTES
Patient's daughter called and stated that the pt has heaviness in her head and feels very dizzy. Currently taking a Medrol dosepack. Chart reviewed with Dr. Olmstead and he recommended that she should go to the ED. Medrol dosepack should not cause those symptoms. Pt's daughter verbalized understanding.

## (undated) DEVICE — DISPOSABLE DISTAL ATTACHMENT: Brand: DISPOSABLE DISTAL ATTACHMENT

## (undated) DEVICE — SUTURE VICRYL 0

## (undated) DEVICE — ENDOSCOPY PACK UPPER: Brand: MEDLINE INDUSTRIES, INC.

## (undated) DEVICE — TOWEL: OR BLU 80/CS: Brand: MEDICAL ACTION INDUSTRIES

## (undated) DEVICE — COTTON BALLS LG STERILE

## (undated) DEVICE — CHLORAPREP 26ML APPLICATOR

## (undated) DEVICE — SUTURE MONOCRYL 4-0 PS-2

## (undated) DEVICE — SOL  .9 1000ML BTL

## (undated) DEVICE — HEMOCLIP HORIZON SM 001200

## (undated) DEVICE — FILTERLINE NASAL ADULT O2/CO2

## (undated) DEVICE — SUTURE VICRYL 3-0 SH

## (undated) DEVICE — ENDOSCOPY PACK - LOWER: Brand: MEDLINE INDUSTRIES, INC.

## (undated) DEVICE — KENDALL SCD EXPRESS SLEEVES, KNEE LENGTH, MEDIUM: Brand: KENDALL SCD

## (undated) DEVICE — 3M™ RED DOT™ MONITORING ELECTRODE WITH FOAM TAPE AND STICKY GEL, 50/BAG, 20/CASE, 72/PLT 2570: Brand: RED DOT™

## (undated) DEVICE — 1200CC GUARDIAN II: Brand: GUARDIAN

## (undated) DEVICE — MEDI-VAC SUCTION HANDLE REGULAR CAPACITY: Brand: CARDINAL HEALTH

## (undated) DEVICE — Device: Brand: DEFENDO AIR/WATER/SUCTION AND BIOPSY VALVE

## (undated) DEVICE — GLOVE BIOGEL M SURG SZ 71/2

## (undated) DEVICE — LIGACLIP EXTRA LIGATING CLIP CARTRIDGES: 6 TITANIUM CLIPS/ CARTRIDGE (SMALL): Brand: LIGACLIP

## (undated) DEVICE — MEDI-VAC NON-CONDUCTIVE SUCTION TUBING: Brand: CARDINAL HEALTH

## (undated) DEVICE — CAUTERY NEEDLE 2IN INS E1465

## (undated) DEVICE — MINI LAP PACK-LF: Brand: MEDLINE INDUSTRIES, INC.

## (undated) DEVICE — BANDAID COVERLET 1X3

## (undated) NOTE — ED AVS SNAPSHOT
Joshua Casper   MRN: [id number]    Department:  [facility] Emergency Department   Date of Visit:  6/10/2019           Disclosure     Insurance plans vary and the physician(s) referred by the ER may not be covered by your plan.  Please contact you tell this physician (or your personal doctor if your instructions are to return to your personal doctor) about any new or lasting problems. The primary care or specialist physician will see patients referred from the [facility] Emergency Department.  Madyson Moreno

## (undated) NOTE — LETTER
[facility]  Michaela Rodríguez 62 [address] [facility], [address] [place] [address]    Consent for Anesthesia   1.   IMary Cera agree to be cared for by an anesthesiologist, who is specially trained to monitor me and give me medicine to put me to sleep or keep me comforta vision, nerves, or muscles and in extremely rare instances death. 5. My doctor has explained to me other choices available to me for my care (alternatives).   6. Pregnant Patients (“epidural”):  I understand that the risks of having an epidural (medicine g

## (undated) NOTE — LETTER
[facility]  Michaela Rodríguez 62 [address] [facility], [address] [place] [address]    Consent for Operation    Date: _____1/23/2017_____________    Time: _____1924__________    1.  I authorize the performance upon Jose C Teran the following operation:    Procedure(s):  LEFT  P procedure has been videotaped, the surgeon will obtain the original videotape. The hospital will not be responsible for storage or maintenance of this tape.     6. For the purpose of advancing medical education, I consent to the admittance of observers to t STATEMENTS REQUIRING INSERTION OR COMPLETION WERE FILLED IN.     Signature of Patient:   ___________________________    When the patient is a minor or mentally incompetent to give consent:  Signature of person authorized to consent for patient: ____________ drugs/illegal medications). Failure to inform my anesthesiologist about these medicines may increase my risk of anesthetic complications. · If I am allergic to anything or have had a reaction to anesthesia before.     3. I understand how the anesthesia med I have discussed the procedure and information above with the patient (or patient’s representative) and answered their questions. The patient or their representative has agreed to have anesthesia services.     _______________________________________________

## (undated) NOTE — ED AVS SNAPSHOT
Sowmya Flaco   MRN: [id number]    Department:  [facility] Emergency Department   Date of Visit:  7/29/2019           Disclosure     Insurance plans vary and the physician(s) referred by the ER may not be covered by your plan.  Please contact you tell this physician (or your personal doctor if your instructions are to return to your personal doctor) about any new or lasting problems. The primary care or specialist physician will see patients referred from the [facility] Emergency Department.  Tito Diane

## (undated) NOTE — LETTER
[facility] Testing Department  Phone: [phone]  Right Fax: [phone]  [facility] By: JULIOCESAR Pereira RN Date: 3/13/19    Patient Name: Carlin Lobo  Surgery Date: 3/20/2019    CSN: [id number]  Medical Record: [id number]   DO

## (undated) NOTE — ED AVS SNAPSHOT
Home French   MRN: [id number]    Department:  [facility] Emergency Department   Date of Visit:  4/19/2018           Disclosure     Insurance plans vary and the physician(s) referred by the ER may not be covered by your plan.  Please contact you tell this physician (or your personal doctor if your instructions are to return to your personal doctor) about any new or lasting problems. The primary care or specialist physician will see patients referred from the [facility] Emergency Department.  Shelton Lombard

## (undated) NOTE — Clinical Note
Patient Name: Mary Turner  YOB: 1940          MRN number:  [id number]  Date:  4/19/2017  Referring Physician:  Tam Smart    Discharge Summary    Pt has attended 12 visits in Physical Therapy.      Dx: Tension HA L cervical/scap mm sp thoracic outlet radicular L UE sxs to <2x/week (12 visits) -MET  · Pt will be independent and compliant with comprehensive HEP to maintain progress achieved in PT (12 visits) -MET      Plan: D/C with continued compliance to HEP    Patient/Family/Caregiver

## (undated) NOTE — ED AVS SNAPSHOT
[facility] Emergency Department  [address] [address]  Phone:  [phone]  Fax:  Coleman Alford   MRN: [id number]    Department:  [facility] Emergency Department   Date of Visit:  7/29/2019 visit does not uncover every injury or illness.  If you have been referred to a primary care or a specialist physician for a follow-up visit, please tell this physician (or your personal doctor if your instructions are to return to your personal doctor) abo

## (undated) NOTE — Clinical Note
2017    Patient: Joshua Casper  : 1940 Visit date: 2017    Dear  Dr. Arthur Howrad,    I had the pleasure of seeing Joshua Casper in the office today. Please find my assessment and plan below.       PATHOLOGY   I reviewed the pathology repor

## (undated) NOTE — MR AVS SNAPSHOT
[facility] [place]  [address] [facility] [address] [phone]               Thank you for choosing us for your health care visit with Tam Smart MD.  We are glad to serve you and happy to provide you with this summary of your v ? EFFECTIVE April 1, 2017 PATIENTS MUST  THEIR OWN NARCOTIC PRESCRIPTIONS. ? Written prescriptions must be picked up in office. ? Please allow the office 48-72 hours to fill the prescription. ? Patient must present photo ID at time of . ASPIRIN LOW DOSE 81 MG Chew   Generic drug:  aspirin   Chew 1 tablet by mouth daily. ergocalciferol 96773 units Caps   Take 1 capsule by mouth once a week.    Commonly known as:  DRISDOL/VITAMIN D2           famoTIDine 20 MG Tabs   Take 20 mg by return for a follow-up Blood Pressure Check in 1 year.      Lifestyle Modification Recommendations:    Modification Recommendation   Weight Reduction Maintain normal body weight (body mass index 18.5-24.9 kg/m2)   DASH eating plan Adopt a diet rich in fruit 2 ½ hours per week – spread out over time Use a richard to keep you motivated   Don’t forget strength training with weights and resistance Set goals and track your progress   You don’t need to join a gym. Home exercises work great.  Put more priority on exe

## (undated) NOTE — IP AVS SNAPSHOT
[facility] [place] [address] [place] [address], [address] ~ [phone]                Discharge Summary   1/23/2017    [address] [facility]           Admission Information        Provider Department    1/23/2017 Yen Gtz MD  5nw-A Commonly known as:  GLUCOTROL        Take 10 mg by mouth daily. lisinopril 10 MG Tabs   Last time this was given:  20 mg on 1/25/2017  9:30 AM   Commonly known as:  PRINIVIL,ZESTRIL        Take 20 mg by mouth daily. WBC RBC Hemoglobin Hematocrit MCV MCH MCHC RDW Platelet MPV    (46/39/74)  8.1 (01/23/17)  3.90 (01/23/17)  10.3 (L) (01/23/17)  31.0 (L) (01/23/17)  79.5 (L) (01/23/17)  26.4 (L) (01/23/17)  33.2 -- (01/23/17)  268.0 --      Recent Hematology Lab Results Patient [address] to help you get signed up for insurance coverage. Patient [address] is a Federal Navigator program that can help with your Affordable Care Act coverage, as well as all types of Medicaid plans.   To get signed up and covere lisinopril (PRINIVIL,ZESTRIL) 10 MG Oral Tab         Use: Treat abnormal blood pressure (high or low), cardiac conditions; and/or abnormal heart rates/rhythms   Most common side effects: Dizziness or feeling lightheaded (especially with standing), heart r Use:  Treat conditions such as seizures, headaches, depression, anxiety and other neurologic conditions   Most common side effects:  Dizziness, drowsiness, headache, nausea/vomiting, somnolence   What to report to your healthcare team: Dizziness, [place]

## (undated) NOTE — MR AVS SNAPSHOT
[facility] [address] [facility] [address] [phone]               Thank you for choosing us for your health care visit with Rabia Watts MD.  We are glad to serve you and happy to provide you with this summary of your v family member will be picking up prescription, office must be given name of individual in advance and they must present an ID as well. ? The name of the person picking up your prescription must be documented in your chart.   Scheduling Tests    If your phy 140/72 mmHg 88 62.5\" 145 lb 26.08 kg/m2         Current Medications          This list is accurate as of: 2/17/17 10:06 AM.  Always use your most recent med list.                ACTOS 45 MG Tabs   Generic drug:  Pioglitazone HCl   Take 45 mg by mouth yancy Generic drug:  tobramycin-dexamethasone   every 4 (four) hours while awake. topiramate 50 MG Tabs   Take 1 tab po qhs   Commonly known as:  TOPAMAX           TraMADol HCl 50 MG Tabs   Take 1 tablet by mouth every 6 (six) hours.    Commonly known a Normal Orders This Visit    OP REFERRAL TO [address] [[id number] CUSTOM]  Order #:  [id number]         **THIS IS NOT A REFERRAL**  Your physician has recommended you to [facility].   If your insurance requires you to Tom Bruce DASH eating plan Adopt a diet rich in fruits, vegetables, and low fat dairy products with reduced content of saturated and total fat.    Dietary sodium reduction Reduce dietary sodium intake to <= 100 mmol per day (2.4 g sodium or 6 g sodium chloride)   Aer

## (undated) NOTE — ED AVS SNAPSHOT
Mikey Sheikh   MRN: [id number]    Department:  [facility] Emergency Department   Date of Visit:  8/24/2017           Disclosure     Insurance plans vary and the physician(s) referred by the ER may not be covered by your plan.  Please contact you If you have been prescribed any medication(s), please fill your prescription right away and begin taking the medication(s) as directed    If the emergency physician has read X-rays, these will be re-interpreted by a radiologist.  If there is a significant

## (undated) NOTE — MR AVS SNAPSHOT
[address] [facility] [address] [phone]               Thank you for choosing us for your health care visit with KIERSTEN Guevara.   We are glad to serve you and happy to provide you with this summary of your visit family member will be picking up prescription, office must be given name of individual in advance and they must present an ID as well. ? The name of the person picking up your prescription must be documented in your chart.   Scheduling Tests    If your phy This list is accurate as of: 2/2/17  4:44 PM.  Always use your most recent med list.                ACTOS 45 MG Tabs   Generic drug:  Pioglitazone HCl   Take 45 mg by mouth daily. * aspirin 81 MG Tabs   Take 81 mg by mouth daily.            * ASPI MRA, NECK (W+WO) (CPT=70549) [[id number]]    Complete by: Feb 02, 2017 (Approximate)    Assoc Dx:  Chronic intractable headache, unspecified headache type [R51], Dizziness [R42]           MRA BRAIN (CPT=70544) [[id number]]    Complete by:   Feb 02, 2017 (Approx [facility] Scheduling   at [phone]. [employer]     Sign up for [employer], your secure online medical record. [employer] will allow you to access patient instructions from your recent visit,  view other health information, and more.  To si